# Patient Record
Sex: FEMALE | Race: WHITE | NOT HISPANIC OR LATINO | Employment: OTHER | URBAN - METROPOLITAN AREA
[De-identification: names, ages, dates, MRNs, and addresses within clinical notes are randomized per-mention and may not be internally consistent; named-entity substitution may affect disease eponyms.]

---

## 2020-12-18 ENCOUNTER — PATIENT OUTREACH (OUTPATIENT)
Dept: SCHEDULING | Facility: IMAGING CENTER | Age: 70
End: 2020-12-18

## 2020-12-18 NOTE — PROGRESS NOTES
Outcome: I called patient to see if they would like to set up a NP appt with a [facility] for their [facility]. Patient stated they would like to look up specific providers before setting an appt. I provided the member with the [phone] number to call back.    Attempt #1

## 2021-03-03 DIAGNOSIS — Z23 NEED FOR VACCINATION: ICD-10-CM

## 2021-03-30 ENCOUNTER — HOSPITAL ENCOUNTER (OUTPATIENT)
Dept: RADIOLOGY | Facility: MEDICAL CENTER | Age: 71
End: 2021-03-30
Attending: ORTHOPAEDIC SURGERY
Payer: MEDICARE

## 2021-03-30 DIAGNOSIS — M25.511 RIGHT SHOULDER PAIN, UNSPECIFIED CHRONICITY: ICD-10-CM

## 2021-03-30 PROCEDURE — 73221 MRI JOINT UPR EXTREM W/O DYE: CPT | Mod: RT,MH

## 2021-04-07 ENCOUNTER — PATIENT OUTREACH (OUTPATIENT)
Dept: HEALTH INFORMATION MANAGEMENT | Facility: OTHER | Age: 71
End: 2021-04-07

## 2021-04-07 NOTE — PROGRESS NOTES
Outcome: Call back later to schedule CGA    Please transfer to Patient Outreach Team at [phone] when patient returns call.    Attempt #1

## 2021-05-13 NOTE — PROGRESS NOTES
Transfer call from Josefina with SCP. Scheduled Comprehensive Health Assessment on 5/28/21 at 10:00am with Gerson Mancilla. Member will think about calling us back to [facility]. No questions or concerns.   Attempt #3

## 2021-07-28 ENCOUNTER — OFFICE VISIT (OUTPATIENT)
Dept: URGENT CARE | Facility: CLINIC | Age: 71
End: 2021-07-28
Payer: MEDICARE

## 2021-07-28 VITALS
OXYGEN SATURATION: 98 % | BODY MASS INDEX: 27.66 KG/M2 | WEIGHT: 166 LBS | TEMPERATURE: 97.6 F | DIASTOLIC BLOOD PRESSURE: 70 MMHG | SYSTOLIC BLOOD PRESSURE: 102 MMHG | HEIGHT: 65 IN | HEART RATE: 76 BPM | RESPIRATION RATE: 18 BRPM

## 2021-07-28 DIAGNOSIS — T63.441A LOCAL REACTION TO BEE STING, ACCIDENTAL OR UNINTENTIONAL, INITIAL ENCOUNTER: ICD-10-CM

## 2021-07-28 PROCEDURE — 99213 OFFICE O/P EST LOW 20 MIN: CPT | Performed by: PHYSICIAN ASSISTANT

## 2021-07-28 RX ORDER — METHYLPREDNISOLONE SODIUM SUCCINATE 125 MG/2ML
62.5 INJECTION, POWDER, LYOPHILIZED, FOR SOLUTION INTRAMUSCULAR; INTRAVENOUS ONCE
Status: COMPLETED | OUTPATIENT
Start: 2021-07-28 | End: 2021-07-28

## 2021-07-28 RX ORDER — DIPHENHYDRAMINE HCL 25 MG
25 TABLET ORAL EVERY 6 HOURS PRN
COMMUNITY
End: 2021-08-10

## 2021-07-28 RX ORDER — EPINEPHRINE 0.3 MG/.3ML
0.3 INJECTION SUBCUTANEOUS ONCE
Qty: 1 EACH | Refills: 1 | Status: SHIPPED | OUTPATIENT
Start: 2021-07-28 | End: 2021-07-28

## 2021-07-28 RX ADMIN — METHYLPREDNISOLONE SODIUM SUCCINATE 62.5 MG: 125 INJECTION, POWDER, LYOPHILIZED, FOR SOLUTION INTRAMUSCULAR; INTRAVENOUS at 12:04

## 2021-07-28 ASSESSMENT — ENCOUNTER SYMPTOMS
MYALGIAS: 0
DIARRHEA: 0
EYE PAIN: 0
ABDOMINAL PAIN: 0
FEVER: 0
SORE THROAT: 0
CONSTIPATION: 0
COUGH: 0
NAUSEA: 0
SHORTNESS OF BREATH: 0
VOMITING: 0
HEADACHES: 0
CHILLS: 0

## 2021-07-28 NOTE — PROGRESS NOTES
Subjective:   Griselda Obregon is a 71 y.o. female who presents for Bee Sting (crying from the pain, right and fingers hand swollen, happened  x2days ago)      HPI:  71-year-old female presents with bee sting and webspace between second and third finger of her right hand that she sustained 2 days ago.  Pain is continued as well swelling.  She notes the swelling is mildly improved today.  She has no history of anaphylaxis or poor reactions to bee stings in the past.  No difficulty breathing, tongue swelling, wheezing, nausea or vomiting.  Has been icing it with minimal relief.  Has been taking Benadryl which seems to help with the pruritus    Review of Systems   Constitutional: Negative for chills and fever.   HENT: Negative for congestion, ear pain and sore throat.    Eyes: Negative for pain.   Respiratory: Negative for cough and shortness of breath.    Cardiovascular: Negative for chest pain.   Gastrointestinal: Negative for abdominal pain, constipation, diarrhea, nausea and vomiting.   Genitourinary: Negative for dysuria.   Musculoskeletal: Negative for myalgias.   Skin: Positive for itching and rash.   Neurological: Negative for headaches.       Medications:    • diphenhydrAMINE Tabs  • EPINEPHrine Soaj  • MULTIVITAMIN PO  • PROBIOTIC ACIDOPHILUS PO  • traZODone Tabs  • venlafaxine    Allergies: Patient has no known allergies.    Problem List: Griselda Obregon does not have any pertinent problems on file.    Surgical History:  Past Surgical History:   Procedure Laterality Date   • HIP ARTHROPLASTY TOTAL Right 2016    Procedure: HIP ARTHROPLASTY TOTAL;  Surgeon: Les Dyson M.D.;  Location: SURGERY [facility];  Service:    • OTHER Bilateral 2000    foot surgery   • GYN SURGERY  ;            Past Social Hx: Griselda Obregon  reports that she has never smoked. She does not have any smokeless tobacco history on file. She reports current alcohol use. She reports that she does  "not use drugs.     Past Family Hx:  Griselda Obregon family history is not on file.     Problem list, medications, and allergies reviewed by myself today in Epic.     Objective:     /70 (BP Location: Left arm, Patient Position: Sitting, BP Cuff Size: Adult)   Pulse 76   Temp 36.4 °C (97.6 °F) (Temporal)   Resp 18   Ht 1.651 m (5' 5\")   Wt 75.3 kg (166 lb)   SpO2 98%   BMI 27.62 kg/m²     Physical Exam  Vitals reviewed.   Constitutional:       Appearance: Normal appearance.   HENT:      Head: Normocephalic and atraumatic.      Right Ear: External ear normal.      Left Ear: External ear normal.      Nose: Nose normal.      Mouth/Throat:      Mouth: Mucous membranes are moist.   Eyes:      Conjunctiva/sclera: Conjunctivae normal.   Cardiovascular:      Rate and Rhythm: Normal rate.   Pulmonary:      Effort: Pulmonary effort is normal.   Skin:     General: Skin is warm and dry.      Capillary Refill: Capillary refill takes less than 2 seconds.      Comments: Diffuse edema of the right hand and fingers.  Focal rash webspace between second and third finger with obvious injection site from envenomation.  Brisk cap refill.  Distally neurovascularly intact.   Neurological:      Mental Status: She is alert and oriented to person, place, and time.         Assessment/Plan:     Diagnosis and associated orders:     1. Local reaction to bee sting, accidental or unintentional, initial encounter  methylPREDNISolone sod succ (SOLU-MEDROL) 125 MG injection 62.5 mg    EPINEPHrine (EPIPEN) 0.3 MG/0.3ML Solution Auto-injector solution for injection      Comments/MDM:     • Solu-Medrol 62.5 mg in clinic  • Continue Benadryl or consider Claritin during the day and Benadryl at night  • Try topical diphenhydramine cream  • Elevate  • Ice  • Review of up-to-date reveals patient at modest increased risk of anaphylaxis secondary to repeat bee sting, as a matter of caution will prescribe EpiPen.  • Return if not significantly " improving in the next 24 to 48 hours  • Counseled patient that pain and envenomation may worsen for the first 24 to 48 hours, she is around 48 hours out but she may continue to have some pain and swelling for 3 to 7 days following.  If wound does not follow the trajectory she is to return to clinic immediately.         Differential diagnosis, natural history, supportive care, and indications for immediate follow-up discussed.    Advised the patient to follow-up with the primary care physician for recheck, reevaluation, and consideration of further management.    Please note that this dictation was created using voice recognition software. I have made a reasonable attempt to correct obvious errors, but I expect that there are errors of grammar and possibly content that I did not discover before finalizing the note.    This note was electronically signed by David Palacios PA-C

## 2021-07-29 ENCOUNTER — TELEPHONE (OUTPATIENT)
Dept: SCHEDULING | Facility: IMAGING CENTER | Age: 71
End: 2021-07-29

## 2021-08-02 NOTE — NON-PROVIDER
Outcome: Left message to reschedule rajeev     Please transfer to Patient Outreach Team at [phone] when patient returns call.      Attempt #1

## 2021-08-02 NOTE — NON-PROVIDER
Outcome:  Rescheduled ZULMA    Please transfer to Patient Outreach Team at [phone] when patient returns call.      [facility] Verified: yes

## 2021-08-10 ENCOUNTER — OFFICE VISIT (OUTPATIENT)
Dept: MEDICAL GROUP | Facility: MEDICAL CENTER | Age: 71
End: 2021-08-10
Payer: MEDICARE

## 2021-08-10 VITALS
BODY MASS INDEX: 28.12 KG/M2 | TEMPERATURE: 98.6 F | RESPIRATION RATE: 16 BRPM | HEIGHT: 65 IN | SYSTOLIC BLOOD PRESSURE: 114 MMHG | DIASTOLIC BLOOD PRESSURE: 70 MMHG | WEIGHT: 168.8 LBS | OXYGEN SATURATION: 96 % | HEART RATE: 80 BPM

## 2021-08-10 DIAGNOSIS — F51.01 PRIMARY INSOMNIA: ICD-10-CM

## 2021-08-10 DIAGNOSIS — Z23 NEED FOR VACCINATION: ICD-10-CM

## 2021-08-10 DIAGNOSIS — Z11.59 NEED FOR HEPATITIS C SCREENING TEST: ICD-10-CM

## 2021-08-10 DIAGNOSIS — R73.9 HYPERGLYCEMIA: ICD-10-CM

## 2021-08-10 DIAGNOSIS — E03.9 ACQUIRED HYPOTHYROIDISM: ICD-10-CM

## 2021-08-10 DIAGNOSIS — F33.42 RECURRENT MAJOR DEPRESSIVE DISORDER, IN FULL REMISSION (HCC): ICD-10-CM

## 2021-08-10 DIAGNOSIS — Z78.0 POSTMENOPAUSAL: ICD-10-CM

## 2021-08-10 PROCEDURE — 99204 OFFICE O/P NEW MOD 45 MIN: CPT | Mod: 25 | Performed by: FAMILY MEDICINE

## 2021-08-10 PROCEDURE — G0009 ADMIN PNEUMOCOCCAL VACCINE: HCPCS | Performed by: FAMILY MEDICINE

## 2021-08-10 PROCEDURE — 90732 PPSV23 VACC 2 YRS+ SUBQ/IM: CPT | Performed by: FAMILY MEDICINE

## 2021-08-10 RX ORDER — TRAZODONE HYDROCHLORIDE 50 MG/1
50 TABLET ORAL
Qty: 90 TABLET | Refills: 2 | Status: SHIPPED | OUTPATIENT
Start: 2021-08-10 | End: 2022-10-18

## 2021-08-10 RX ORDER — VENLAFAXINE HYDROCHLORIDE 150 MG/1
150 CAPSULE, EXTENDED RELEASE ORAL DAILY
Qty: 90 CAPSULE | Refills: 2 | Status: SHIPPED | OUTPATIENT
Start: 2021-08-10 | End: 2022-10-18

## 2021-08-10 ASSESSMENT — PATIENT HEALTH QUESTIONNAIRE - PHQ9: CLINICAL INTERPRETATION OF PHQ2 SCORE: 0

## 2021-08-10 NOTE — ASSESSMENT & PLAN NOTE
Griselda is trying diligently to lose weight; she has had success previously with phentermine. She is finding it difficult to get back to a regular exercise routine.

## 2021-08-10 NOTE — LETTER
[facility]  Margot Garcia M.D.  [address]  [place] [address]  Fax: [phone]   Authorization for Release/Disclosure of   Protected Health Information   Name: GRISELDA URRUTIA : 1950 SSN: [id number]   Address: [address] [place] [address] [place] [address] Phone:    [phone] (home)    I authorize the entity listed below to release/disclose the PHI below to:   [facility]/Margot Gracia M.D. and Margot Garcia M.D.   Provider or Entity Name:[facility]     Address   City, [facility]   Phone:[phone]      Fax:[phone]     Reason for request: continuity of care   Information to be released:    [ xxx ] LAST COLONOSCOPY,  including any PATH REPORT and follow-up  [  ] LAST FIT/COLOGUARD RESULT [  ] LAST DEXA  [  ] LAST MAMMOGRAM  [  ] LAST PAP  [  ] LAST LABS [  ] RETINA EXAM REPORT  [  ] IMMUNIZATION RECORDS  [  ] Release all info      [  ] Check here and initial the line next to each item to release ALL health information INCLUDING  _____ Care and treatment for drug and / or alcohol abuse  _____ HIV testing, infection status, or AIDS  _____ Genetic Testing    DATES OF SERVICE OR TIME PERIOD TO BE DISCLOSED: _____________  I understand and acknowledge that:  * This Authorization may be revoked at any time by you in writing, except if your health information has already been used or disclosed.  * Your health information that will be used or disclosed as a result of you signing this authorization could be re-disclosed by the recipient. If this occurs, your re-disclosed health information may no longer be protected by State or Federal laws.  * You may refuse to sign this Authorization. Your refusal will not affect your ability to obtain treatment.  * This Authorization becomes effective upon signing and will  on (date) __________.      If no date is indicated, this Authorization will  one (1) year from the signature date.    Name: Griselda Lucas  Mindi    Signature:Continuation of Care   Date:     8/10/2021       PLEASE FAX REQUESTED RECORDS BACK TO: [phone]

## 2021-08-10 NOTE — LETTER
[facility]  Margot Garcia M.D.  [address]  [place] [address]  Fax: [phone]   Authorization for Release/Disclosure of   Protected Health Information   Name: GRISELDA URRUTIA : 1950 SSN: [id number]   Address: [address] [place] [address] [address] [place] [address] Phone:    [phone] (home)    I authorize the entity listed below to release/disclose the PHI below to:   [facility]/Margot Garcia M.D. and Margot Garcia M.D.   Provider or Entity Name:[facility] Address City, State, Zip   Phone:[phone]      Fax:[phone]     Reason for request: continuity of care   Information to be released:    [  ] LAST COLONOSCOPY,  including any PATH REPORT and follow-up  [  ] LAST FIT/COLOGUARD RESULT [  ] LAST DEXA  [  ] LAST MAMMOGRAM  [  ] LAST PAP  [  ] LAST LABS [  ] RETINA EXAM REPORT  [  ] IMMUNIZATION RECORDS  [ xxx ] Release all info      [  ] Check here and initial the line next to each item to release ALL health information INCLUDING  _____ Care and treatment for drug and / or alcohol abuse  _____ HIV testing, infection status, or AIDS  _____ Genetic Testing    DATES OF SERVICE OR TIME PERIOD TO BE DISCLOSED: _____________  I understand and acknowledge that:  * This Authorization may be revoked at any time by you in writing, except if your health information has already been used or disclosed.  * Your health information that will be used or disclosed as a result of you signing this authorization could be re-disclosed by the recipient. If this occurs, your re-disclosed health information may no longer be protected by State or Federal laws.  * You may refuse to sign this Authorization. Your refusal will not affect your ability to obtain treatment.  * This Authorization becomes effective upon signing and will  on (date) __________.      If no date is indicated, this Authorization will  one (1) year from the signature date.    Name: Griselda Lucas  Mindi    Signature:Continuation of Care   Date:     8/10/2021       PLEASE FAX REQUESTED RECORDS BACK TO: [phone]

## 2021-08-10 NOTE — PROGRESS NOTES
Subjective:     CC: establish care    HPI:   Griselda presents today to establish care. She is a consultant in communication, single, has two children and grandchildren who live locally, one of her sons is co-owner at [employer]. She enjoys skiing. She is UTD on colonoscopy, mammogram is scheduled, due for dexa scan.     BMI 28.0-28.9,adult  Griselda is trying diligently to lose weight; she has had success previously with phentermine. She is finding it difficult to get back to a regular exercise routine.       Recurrent major depressive disorder, in full remission (HCC)  Griselda reports her mood is well-controlled with venlafaxine XL 150mg daily. She has been on this medication for several years and definitely notices a difference if she runs out of medication.     Primary insomnia  Griselda reports her insomnia is well-controlled with trazodone 50mg qhs.      Past Medical History:   Diagnosis Date   • Bowel habit changes     constipation   • Pain 11/8/16    Right hip   • Psychiatric problem     depression       Social History     Tobacco Use   • Smoking status: Never Smoker   • Smokeless tobacco: Never Used   Vaping Use   • Vaping Use: Never used   Substance Use Topics   • Alcohol use: Yes     Comment: once a month   • Drug use: No       Current Outpatient Medications Ordered in Epic   Medication Sig Dispense Refill   • venlafaxine (EFFEXOR-XR) 150 MG extended-release capsule Take 1 capsule by mouth every day. 90 capsule 2   • traZODone (DESYREL) 50 MG Tab Take 1 tablet by mouth at bedtime. 90 tablet 2     No current Epic-ordered facility-administered medications on file.       Allergies:  Bee    Health Maintenance: See above    ROS:  Gen: no fevers/chills, no changes in weight  Eyes: no changes in vision  ENT: no sore throat, no hearing loss, no bloody nose  Pulm: no SOB  CV: no chest pain    Objective:     Exam:  /70 (BP Location: Left arm, Patient Position: Sitting, BP Cuff Size: Adult long)   Pulse 80   Temp 37  "°C (98.6 °F) (Temporal)   Resp 16   Ht 1.651 m (5' 5\")   Wt 76.6 kg (168 lb 12.8 oz)   SpO2 96%   BMI 28.09 kg/m²  Body mass index is 28.09 kg/m².    Gen: Alert and oriented, No apparent distress  Lungs: Normal effort, CTA bilaterally, no wheezes, rhonchi, or rales  CV: Regular rate and rhythm, no murmurs, rubs, or gallops      Assessment & Plan:     71 y.o. female with the following -     1. Postmenopausal  - DS-BONE DENSITY STUDY (DEXA); Future    2. Acquired hypothyroidism  Griselda reports history of \"mild\" hypothyroidism, was previously on low dose levothyroxine  - Lipid Profile; Future  - Comp Metabolic Panel; Future  - HEMOGLOBIN A1C; Future  - CBC WITH DIFFERENTIAL; Future  - TSH WITH REFLEX TO FT4; Future    3. Hyperglycemia  - HEMOGLOBIN A1C; Future    4. BMI 28.0-28.9,adult  - Griselda is working on weight loss, suspect untreated hypothyroidism may be contributing to her difficulty in weight loss    5. Recurrent major depressive disorder, in full remission (HCC)  - venlafaxine (EFFEXOR-XR) 150 MG extended-release capsule; Take 1 capsule by mouth every day.  Dispense: 90 capsule; Refill: 2    6. Primary insomnia  - traZODone (DESYREL) 50 MG Tab; Take 1 tablet by mouth at bedtime.  Dispense: 90 tablet; Refill: 2    7. Need for hepatitis C screening test  - HCV Scrn ( 6398-0619 1xLife); Future    8. Need for vaccination  - Pneumococal Polysaccharide Vaccine 23-Valent =>1YO SQ/IM      Return in about 6 weeks (around 2021) for F/U lab.    Please note this dictation was created using voice recognition software. I have made every reasonable attempt to correct obvious errors, but I expect there may be errors of grammar, and possibly content, that I did not discover before finalizing the note.       "

## 2021-09-22 ENCOUNTER — TELEPHONE (OUTPATIENT)
Dept: MEDICAL GROUP | Facility: MEDICAL CENTER | Age: 71
End: 2021-09-22

## 2021-09-22 ENCOUNTER — HOSPITAL ENCOUNTER (OUTPATIENT)
Dept: RADIOLOGY | Facility: MEDICAL CENTER | Age: 71
End: 2021-09-22
Attending: FAMILY MEDICINE
Payer: MEDICARE

## 2021-09-22 DIAGNOSIS — Z78.0 POSTMENOPAUSAL: ICD-10-CM

## 2021-09-22 DIAGNOSIS — Z12.31 VISIT FOR SCREENING MAMMOGRAM: ICD-10-CM

## 2021-09-22 PROCEDURE — 77063 BREAST TOMOSYNTHESIS BI: CPT

## 2021-09-22 PROCEDURE — 77080 DXA BONE DENSITY AXIAL: CPT

## 2021-09-22 NOTE — TELEPHONE ENCOUNTER
Phone Number Called: [phone] (home)     Call outcome: Did not leave a detailed message. Requested patient to call back.    Message:   ----- Message from Margot Garcia M.D. sent at 9/22/2021 12:56 PM PDT -----  Please notify patient that I have reviewed her bone density scan which did demonstrate mild bone loss. I would recommend daily weight bearing exercise, calcium - 1,200mg daily through dietary and supplemental sources, and at least 800IU of vitamin D daily

## 2021-09-23 ENCOUNTER — TELEPHONE (OUTPATIENT)
Dept: MEDICAL GROUP | Facility: MEDICAL CENTER | Age: 71
End: 2021-09-23

## 2021-09-23 NOTE — TELEPHONE ENCOUNTER
Phone Number Called: [phone] (home)     Call outcome: Spoke to patient regarding message below.    Message: Informed pt about her bone density results. Pt understood and did not have any further questions.

## 2021-09-27 ENCOUNTER — HOSPITAL ENCOUNTER (OUTPATIENT)
Dept: RADIOLOGY | Facility: MEDICAL CENTER | Age: 71
End: 2021-09-27
Payer: MEDICARE

## 2021-10-01 ENCOUNTER — HOSPITAL ENCOUNTER (OUTPATIENT)
Dept: LAB | Facility: MEDICAL CENTER | Age: 71
End: 2021-10-01
Attending: FAMILY MEDICINE
Payer: MEDICARE

## 2021-10-01 ENCOUNTER — TELEPHONE (OUTPATIENT)
Dept: MEDICAL GROUP | Facility: MEDICAL CENTER | Age: 71
End: 2021-10-01

## 2021-10-01 DIAGNOSIS — R73.9 HYPERGLYCEMIA: ICD-10-CM

## 2021-10-01 DIAGNOSIS — Z11.59 NEED FOR HEPATITIS C SCREENING TEST: ICD-10-CM

## 2021-10-01 DIAGNOSIS — E03.9 ACQUIRED HYPOTHYROIDISM: ICD-10-CM

## 2021-10-01 LAB
ALBUMIN SERPL BCP-MCNC: 4.4 G/DL (ref 3.2–4.9)
ALBUMIN/GLOB SERPL: 1.6 G/DL
ALP SERPL-CCNC: 80 U/L (ref 30–99)
ALT SERPL-CCNC: 19 U/L (ref 2–50)
ANION GAP SERPL CALC-SCNC: 11 MMOL/L (ref 7–16)
AST SERPL-CCNC: 19 U/L (ref 12–45)
BASOPHILS # BLD AUTO: 0.4 % (ref 0–1.8)
BASOPHILS # BLD: 0.02 K/UL (ref 0–0.12)
BILIRUB SERPL-MCNC: 0.5 MG/DL (ref 0.1–1.5)
BUN SERPL-MCNC: 29 MG/DL (ref 8–22)
CALCIUM SERPL-MCNC: 9.6 MG/DL (ref 8.5–10.5)
CHLORIDE SERPL-SCNC: 103 MMOL/L (ref 96–112)
CHOLEST SERPL-MCNC: 205 MG/DL (ref 100–199)
CO2 SERPL-SCNC: 25 MMOL/L (ref 20–33)
CREAT SERPL-MCNC: 1.12 MG/DL (ref 0.5–1.4)
EOSINOPHIL # BLD AUTO: 0.04 K/UL (ref 0–0.51)
EOSINOPHIL NFR BLD: 0.9 % (ref 0–6.9)
ERYTHROCYTE [DISTWIDTH] IN BLOOD BY AUTOMATED COUNT: 45 FL (ref 35.9–50)
EST. AVERAGE GLUCOSE BLD GHB EST-MCNC: 103 MG/DL
FASTING STATUS PATIENT QL REPORTED: NORMAL
GLOBULIN SER CALC-MCNC: 2.8 G/DL (ref 1.9–3.5)
GLUCOSE SERPL-MCNC: 91 MG/DL (ref 65–99)
HBA1C MFR BLD: 5.2 % (ref 4–5.6)
HCT VFR BLD AUTO: 44.1 % (ref 37–47)
HCV AB SER QL: NORMAL
HDLC SERPL-MCNC: 56 MG/DL
HGB BLD-MCNC: 14.8 G/DL (ref 12–16)
IMM GRANULOCYTES # BLD AUTO: 0.01 K/UL (ref 0–0.11)
IMM GRANULOCYTES NFR BLD AUTO: 0.2 % (ref 0–0.9)
LDLC SERPL CALC-MCNC: 132 MG/DL
LYMPHOCYTES # BLD AUTO: 1.86 K/UL (ref 1–4.8)
LYMPHOCYTES NFR BLD: 40.5 % (ref 22–41)
MCH RBC QN AUTO: 31.2 PG (ref 27–33)
MCHC RBC AUTO-ENTMCNC: 33.6 G/DL (ref 33.6–35)
MCV RBC AUTO: 92.8 FL (ref 81.4–97.8)
MONOCYTES # BLD AUTO: 0.38 K/UL (ref 0–0.85)
MONOCYTES NFR BLD AUTO: 8.3 % (ref 0–13.4)
NEUTROPHILS # BLD AUTO: 2.28 K/UL (ref 2–7.15)
NEUTROPHILS NFR BLD: 49.7 % (ref 44–72)
NRBC # BLD AUTO: 0 K/UL
NRBC BLD-RTO: 0 /100 WBC
PLATELET # BLD AUTO: 210 K/UL (ref 164–446)
PMV BLD AUTO: 11.5 FL (ref 9–12.9)
POTASSIUM SERPL-SCNC: 4.1 MMOL/L (ref 3.6–5.5)
PROT SERPL-MCNC: 7.2 G/DL (ref 6–8.2)
RBC # BLD AUTO: 4.75 M/UL (ref 4.2–5.4)
SODIUM SERPL-SCNC: 139 MMOL/L (ref 135–145)
TRIGL SERPL-MCNC: 87 MG/DL (ref 0–149)
TSH SERPL DL<=0.005 MIU/L-ACNC: 4.2 UIU/ML (ref 0.38–5.33)
WBC # BLD AUTO: 4.6 K/UL (ref 4.8–10.8)

## 2021-10-01 PROCEDURE — 36415 COLL VENOUS BLD VENIPUNCTURE: CPT

## 2021-10-01 PROCEDURE — 84443 ASSAY THYROID STIM HORMONE: CPT

## 2021-10-01 PROCEDURE — 80061 LIPID PANEL: CPT

## 2021-10-01 PROCEDURE — 83036 HEMOGLOBIN GLYCOSYLATED A1C: CPT

## 2021-10-01 PROCEDURE — G0472 HEP C SCREEN HIGH RISK/OTHER: HCPCS

## 2021-10-01 PROCEDURE — 80053 COMPREHEN METABOLIC PANEL: CPT

## 2021-10-01 PROCEDURE — 85025 COMPLETE CBC W/AUTO DIFF WBC: CPT

## 2021-10-01 NOTE — TELEPHONE ENCOUNTER
Phone Number Called: [phone] (home)     Call outcome: Did not leave a detailed message. Requested patient to call back.    Message:   ----- Message from Margot Garcia M.D. sent at 10/1/2021  9:04 AM PDT -----  Please notify patient that no concerning masses were identified on mammogram.  We will plan to repeat her screening in 1 year.

## 2021-10-04 ENCOUNTER — TELEPHONE (OUTPATIENT)
Dept: MEDICAL GROUP | Facility: MEDICAL CENTER | Age: 71
End: 2021-10-04

## 2021-10-04 NOTE — TELEPHONE ENCOUNTER
Called and spoke to pt. Relayed lab results notes per Dr. Garcia. Pt verbalized understanding and denies any further needs at this time.

## 2021-10-08 ENCOUNTER — OFFICE VISIT (OUTPATIENT)
Dept: MEDICAL GROUP | Facility: MEDICAL CENTER | Age: 71
End: 2021-10-08
Payer: MEDICARE

## 2021-10-08 VITALS
BODY MASS INDEX: 29.02 KG/M2 | HEIGHT: 64 IN | DIASTOLIC BLOOD PRESSURE: 68 MMHG | OXYGEN SATURATION: 90 % | SYSTOLIC BLOOD PRESSURE: 110 MMHG | WEIGHT: 170 LBS | RESPIRATION RATE: 20 BRPM | TEMPERATURE: 98.4 F | HEART RATE: 84 BPM

## 2021-10-08 DIAGNOSIS — Z23 NEED FOR VACCINATION: ICD-10-CM

## 2021-10-08 DIAGNOSIS — E78.5 DYSLIPIDEMIA: ICD-10-CM

## 2021-10-08 DIAGNOSIS — F33.42 RECURRENT MAJOR DEPRESSIVE DISORDER, IN FULL REMISSION (HCC): ICD-10-CM

## 2021-10-08 DIAGNOSIS — R94.4 DECREASED GFR: ICD-10-CM

## 2021-10-08 DIAGNOSIS — M85.80 OSTEOPENIA, UNSPECIFIED LOCATION: ICD-10-CM

## 2021-10-08 PROCEDURE — G0008 ADMIN INFLUENZA VIRUS VAC: HCPCS | Performed by: FAMILY MEDICINE

## 2021-10-08 PROCEDURE — 90750 HZV VACC RECOMBINANT IM: CPT | Performed by: FAMILY MEDICINE

## 2021-10-08 PROCEDURE — 90472 IMMUNIZATION ADMIN EACH ADD: CPT | Performed by: FAMILY MEDICINE

## 2021-10-08 PROCEDURE — 99214 OFFICE O/P EST MOD 30 MIN: CPT | Mod: 25 | Performed by: FAMILY MEDICINE

## 2021-10-08 PROCEDURE — 90662 IIV NO PRSV INCREASED AG IM: CPT | Performed by: FAMILY MEDICINE

## 2021-10-08 ASSESSMENT — ANXIETY QUESTIONNAIRES
6. BECOMING EASILY ANNOYED OR IRRITABLE: NOT AT ALL
2. NOT BEING ABLE TO STOP OR CONTROL WORRYING: SEVERAL DAYS
1. FEELING NERVOUS, ANXIOUS, OR ON EDGE: SEVERAL DAYS
3. WORRYING TOO MUCH ABOUT DIFFERENT THINGS: SEVERAL DAYS
IF YOU CHECKED OFF ANY PROBLEMS ON THIS QUESTIONNAIRE, HOW DIFFICULT HAVE THESE PROBLEMS MADE IT FOR YOU TO DO YOUR WORK, TAKE CARE OF THINGS AT HOME, OR GET ALONG WITH OTHER PEOPLE: NOT DIFFICULT AT ALL
GAD7 TOTAL SCORE: 4
7. FEELING AFRAID AS IF SOMETHING AWFUL MIGHT HAPPEN: SEVERAL DAYS
4. TROUBLE RELAXING: NOT AT ALL
5. BEING SO RESTLESS THAT IT IS HARD TO SIT STILL: NOT AT ALL

## 2021-10-08 ASSESSMENT — PATIENT HEALTH QUESTIONNAIRE - PHQ9
SUM OF ALL RESPONSES TO PHQ QUESTIONS 1-9: 9
CLINICAL INTERPRETATION OF PHQ2 SCORE: 2
5. POOR APPETITE OR OVEREATING: 2 - MORE THAN HALF THE DAYS

## 2021-10-08 ASSESSMENT — FIBROSIS 4 INDEX: FIB4 SCORE: 1.452966314513557851

## 2021-10-08 NOTE — ASSESSMENT & PLAN NOTE
Generally well-controlled on venlafaxine XR 150mg daily however Griselda report she has been quite anxious and at times depressed about aging. She does not want to be a burden for her children. She is nervous about the changes aging may bring.

## 2021-10-08 NOTE — PROGRESS NOTES
Subjective:     CC: follow up labs, imaging    HPI:   Griselda presents today with:    Recurrent major depressive disorder, in full remission (HCC)  Generally well-controlled on venlafaxine XR 150mg daily however Griselda report she has been quite anxious and at times depressed about aging. She does not want to be a burden for her children. She is nervous about the changes aging may bring.    Dyslipidemia  Recent labs demonstrate mild-mod elevation of LDL per below. 10 year ASCVD risk 7.7%. Griselda reports a very healthy Mediterranean type diet with minimal red meat, healthy fats. No family history of cardiovascular disease.    Lab Results   Component Value Date/Time    CHOLSTRLTOT 205 (H) 10/01/2021 11:05 AM     (H) 10/01/2021 11:05 AM    HDL 56 10/01/2021 11:05 AM    TRIGLYCERIDE 87 10/01/2021 11:05 AM           Decreased GFR  Recent labs reveal GFR of 48. She does use naproxen less than once weekly.  She reports she had not had anything to drink preceding labs.    Osteopenia  Griselda is getting regular weight-bearing exercise. Adequate calcium and vit D.    Dexa 9/2021:   10-year Probability of Fracture:  Major Osteoporotic     10.5%  Hip     1.8%  Population      [place] ()      Past Medical History:   Diagnosis Date   • Bowel habit changes     constipation   • Pain 11/8/16    Right hip   • Psychiatric problem     depression       Social History     Tobacco Use   • Smoking status: Never Smoker   • Smokeless tobacco: Never Used   Vaping Use   • Vaping Use: Never used   Substance Use Topics   • Alcohol use: Yes     Comment: once a month   • Drug use: No       Current Outpatient Medications Ordered in Epic   Medication Sig Dispense Refill   • venlafaxine (EFFEXOR-XR) 150 MG extended-release capsule Take 1 capsule by mouth every day. 90 capsule 2   • traZODone (DESYREL) 50 MG Tab Take 1 tablet by mouth at bedtime. 90 tablet 2     No current Epic-ordered facility-administered medications on file.  "      Allergies:  Bee    Health Maintenance: Patient reports she hasn't had a pap for many years; will complete one more pap (if normal). Colonoscopy UTD, scanned in media    ROS:  Gen: no fevers/chills, no changes in weight  Eyes: no changes in vision  ENT: no sore throat, no hearing loss, no bloody nose  Pulm: no SOB  CV: no chest pain    Objective:       Exam:  /68 (BP Location: Left arm, Patient Position: Sitting, BP Cuff Size: Adult long)   Pulse 84   Temp 36.9 °C (98.4 °F) (Temporal)   Resp 20   Ht 1.626 m (5' 4\")   Wt 77.1 kg (170 lb)   SpO2 90%   BMI 29.18 kg/m²  Body mass index is 29.18 kg/m².    Constitutional: Alert, no distress, well-groomed  Skin: Warm, dry, good turgor, no rashes in visible areas  Eyes: Equal, round and reactive, conjunctiva clear, no ptosis  Respiratory: Unlabored respiratory effort  Psych: Alert and oriented, normal affect and mood      Assessment & Plan:     71 y.o. female with the following -     1. Recurrent major depressive disorder, in full remission (HCC)  Stable, Griselda will be working on finding psychologist or  to help her navigate her anxiety regarding aging. Continue venlafaxine XR 150mg daily, continue regular exercise healthy diet, positive social interaction    2. Dyslipidemia  Discussed importance of continuing healthy, Mediterranean type diet    3. Decreased GFR  Perhaps secondary to dehydration; recommended patient avoid NSAIDS  - Basic Metabolic Panel; Future    4. Osteopenia, unspecified location  - Continue weight bearing exercise; adequate vit D and calcium    5. Need for vaccination  - Influenza Vaccine, High Dose (65+ Only)  - Shingrix Vaccine      Return in about 1 month (around 11/8/2021) for Pap.    Please note this dictation was created using voice recognition software. I have made every reasonable attempt to correct obvious errors, but I expect there may be errors of grammar, and possibly content, that I did not discover before " finalizing the note.

## 2021-10-08 NOTE — ASSESSMENT & PLAN NOTE
Recent labs demonstrate mild-mod elevation of LDL per below. 10 year ASCVD risk 7.7%. Griselda reports a very healthy Mediterranean type diet with minimal red meat, healthy fats. No family history of cardiovascular disease.    Lab Results   Component Value Date/Time    CHOLSTRLTOT 205 (H) 10/01/2021 11:05 AM     (H) 10/01/2021 11:05 AM    HDL 56 10/01/2021 11:05 AM    TRIGLYCERIDE 87 10/01/2021 11:05 AM

## 2021-10-08 NOTE — ASSESSMENT & PLAN NOTE
Griselda is getting regular weight-bearing exercise. Adequate calcium and vit D.    Dexa 9/2021:   10-year Probability of Fracture:  Major Osteoporotic     10.5%  Hip     1.8%  Population      [place] ()

## 2021-10-08 NOTE — ASSESSMENT & PLAN NOTE
Recent labs reveal GFR of 48. She does use naproxen less than once weekly.  She reports she had not had anything to drink preceding labs.

## 2021-11-11 ENCOUNTER — HOSPITAL ENCOUNTER (OUTPATIENT)
Dept: LAB | Facility: MEDICAL CENTER | Age: 71
End: 2021-11-11
Attending: FAMILY MEDICINE
Payer: MEDICARE

## 2021-11-11 DIAGNOSIS — R94.4 DECREASED GFR: ICD-10-CM

## 2021-11-11 LAB
ANION GAP SERPL CALC-SCNC: 11 MMOL/L (ref 7–16)
BUN SERPL-MCNC: 25 MG/DL (ref 8–22)
CALCIUM SERPL-MCNC: 9.7 MG/DL (ref 8.5–10.5)
CHLORIDE SERPL-SCNC: 105 MMOL/L (ref 96–112)
CO2 SERPL-SCNC: 23 MMOL/L (ref 20–33)
CREAT SERPL-MCNC: 1.06 MG/DL (ref 0.5–1.4)
GLUCOSE SERPL-MCNC: 79 MG/DL (ref 65–99)
POTASSIUM SERPL-SCNC: 4.4 MMOL/L (ref 3.6–5.5)
SODIUM SERPL-SCNC: 139 MMOL/L (ref 135–145)

## 2021-11-11 PROCEDURE — 80048 BASIC METABOLIC PNL TOTAL CA: CPT

## 2021-11-11 PROCEDURE — 36415 COLL VENOUS BLD VENIPUNCTURE: CPT

## 2021-11-17 SDOH — ECONOMIC STABILITY: FOOD INSECURITY: WITHIN THE PAST 12 MONTHS, YOU WORRIED THAT YOUR FOOD WOULD RUN OUT BEFORE YOU GOT MONEY TO BUY MORE.: NEVER TRUE

## 2021-11-17 SDOH — ECONOMIC STABILITY: TRANSPORTATION INSECURITY
IN THE PAST 12 MONTHS, HAS THE LACK OF TRANSPORTATION KEPT YOU FROM MEDICAL APPOINTMENTS OR FROM GETTING MEDICATIONS?: NO

## 2021-11-17 SDOH — ECONOMIC STABILITY: TRANSPORTATION INSECURITY
IN THE PAST 12 MONTHS, HAS LACK OF RELIABLE TRANSPORTATION KEPT YOU FROM MEDICAL APPOINTMENTS, MEETINGS, WORK OR FROM GETTING THINGS NEEDED FOR DAILY LIVING?: NO

## 2021-11-17 SDOH — ECONOMIC STABILITY: INCOME INSECURITY: HOW HARD IS IT FOR YOU TO PAY FOR THE VERY BASICS LIKE FOOD, HOUSING, MEDICAL CARE, AND HEATING?: NOT VERY HARD

## 2021-11-17 SDOH — ECONOMIC STABILITY: FOOD INSECURITY: WITHIN THE PAST 12 MONTHS, THE FOOD YOU BOUGHT JUST DIDN'T LAST AND YOU DIDN'T HAVE MONEY TO GET MORE.: NEVER TRUE

## 2021-11-17 SDOH — HEALTH STABILITY: PHYSICAL HEALTH: ON AVERAGE, HOW MANY DAYS PER WEEK DO YOU ENGAGE IN MODERATE TO STRENUOUS EXERCISE (LIKE A BRISK WALK)?: 3 DAYS

## 2021-11-17 SDOH — ECONOMIC STABILITY: INCOME INSECURITY: IN THE LAST 12 MONTHS, WAS THERE A TIME WHEN YOU WERE NOT ABLE TO PAY THE MORTGAGE OR RENT ON TIME?: NO

## 2021-11-17 SDOH — ECONOMIC STABILITY: HOUSING INSECURITY: IN THE LAST 12 MONTHS, HOW MANY PLACES HAVE YOU LIVED?: 1

## 2021-11-17 SDOH — ECONOMIC STABILITY: HOUSING INSECURITY
IN THE LAST 12 MONTHS, WAS THERE A TIME WHEN YOU DID NOT HAVE A STEADY PLACE TO SLEEP OR SLEPT IN A SHELTER (INCLUDING NOW)?: NO

## 2021-11-17 SDOH — HEALTH STABILITY: PHYSICAL HEALTH: ON AVERAGE, HOW MANY MINUTES DO YOU ENGAGE IN EXERCISE AT THIS LEVEL?: 50 MIN

## 2021-11-17 SDOH — ECONOMIC STABILITY: TRANSPORTATION INSECURITY
IN THE PAST 12 MONTHS, HAS LACK OF TRANSPORTATION KEPT YOU FROM MEETINGS, WORK, OR FROM GETTING THINGS NEEDED FOR DAILY LIVING?: NO

## 2021-11-17 SDOH — HEALTH STABILITY: MENTAL HEALTH
STRESS IS WHEN SOMEONE FEELS TENSE, NERVOUS, ANXIOUS, OR CAN'T SLEEP AT NIGHT BECAUSE THEIR MIND IS TROUBLED. HOW STRESSED ARE YOU?: ONLY A LITTLE

## 2021-11-17 ASSESSMENT — SOCIAL DETERMINANTS OF HEALTH (SDOH)
DO YOU BELONG TO ANY CLUBS OR ORGANIZATIONS SUCH AS CHURCH GROUPS UNIONS, FRATERNAL OR ATHLETIC GROUPS, OR SCHOOL GROUPS?: YES
DO YOU BELONG TO ANY CLUBS OR ORGANIZATIONS SUCH AS CHURCH GROUPS UNIONS, FRATERNAL OR ATHLETIC GROUPS, OR SCHOOL GROUPS?: YES
HOW OFTEN DO YOU ATTEND CHURCH OR RELIGIOUS SERVICES?: NEVER
HOW OFTEN DO YOU HAVE SIX OR MORE DRINKS ON ONE OCCASION: NEVER
IN A TYPICAL WEEK, HOW MANY TIMES DO YOU TALK ON THE PHONE WITH FAMILY, FRIENDS, OR NEIGHBORS?: ONCE A WEEK
HOW HARD IS IT FOR YOU TO PAY FOR THE VERY BASICS LIKE FOOD, HOUSING, MEDICAL CARE, AND HEATING?: NOT VERY HARD
HOW OFTEN DO YOU GET TOGETHER WITH FRIENDS OR RELATIVES?: ONCE A WEEK
HOW OFTEN DO YOU GET TOGETHER WITH FRIENDS OR RELATIVES?: ONCE A WEEK
HOW OFTEN DO YOU HAVE A DRINK CONTAINING ALCOHOL: 2-4 TIMES A MONTH
HOW MANY DRINKS CONTAINING ALCOHOL DO YOU HAVE ON A TYPICAL DAY WHEN YOU ARE DRINKING: 1 OR 2
WITHIN THE PAST 12 MONTHS, YOU WORRIED THAT YOUR FOOD WOULD RUN OUT BEFORE YOU GOT THE MONEY TO BUY MORE: NEVER TRUE
HOW OFTEN DO YOU ATTEND CHURCH OR RELIGIOUS SERVICES?: NEVER
HOW OFTEN DO YOU ATTENT MEETINGS OF THE CLUB OR ORGANIZATION YOU BELONG TO?: MORE THAN 4 TIMES PER YEAR
HOW OFTEN DO YOU ATTENT MEETINGS OF THE CLUB OR ORGANIZATION YOU BELONG TO?: MORE THAN 4 TIMES PER YEAR
IN A TYPICAL WEEK, HOW MANY TIMES DO YOU TALK ON THE PHONE WITH FAMILY, FRIENDS, OR NEIGHBORS?: ONCE A WEEK

## 2021-11-17 ASSESSMENT — LIFESTYLE VARIABLES
HOW OFTEN DO YOU HAVE SIX OR MORE DRINKS ON ONE OCCASION: NEVER
HOW MANY STANDARD DRINKS CONTAINING ALCOHOL DO YOU HAVE ON A TYPICAL DAY: 1 OR 2
HOW OFTEN DO YOU HAVE A DRINK CONTAINING ALCOHOL: 2-4 TIMES A MONTH

## 2021-11-23 ENCOUNTER — OFFICE VISIT (OUTPATIENT)
Dept: MEDICAL GROUP | Facility: MEDICAL CENTER | Age: 71
End: 2021-11-23
Payer: MEDICARE

## 2021-11-23 VITALS
HEART RATE: 86 BPM | TEMPERATURE: 98.2 F | DIASTOLIC BLOOD PRESSURE: 74 MMHG | HEIGHT: 64 IN | RESPIRATION RATE: 20 BRPM | WEIGHT: 165.79 LBS | BODY MASS INDEX: 28.3 KG/M2 | SYSTOLIC BLOOD PRESSURE: 102 MMHG | OXYGEN SATURATION: 92 %

## 2021-11-23 DIAGNOSIS — F33.42 RECURRENT MAJOR DEPRESSIVE DISORDER, IN FULL REMISSION (HCC): ICD-10-CM

## 2021-11-23 DIAGNOSIS — Z00.00 MEDICARE ANNUAL WELLNESS VISIT, SUBSEQUENT: Primary | ICD-10-CM

## 2021-11-23 DIAGNOSIS — R94.4 DECREASED GFR: ICD-10-CM

## 2021-11-23 PROCEDURE — G0439 PPPS, SUBSEQ VISIT: HCPCS | Performed by: FAMILY MEDICINE

## 2021-11-23 RX ORDER — HYDROXYZINE HYDROCHLORIDE 10 MG/1
10 TABLET, FILM COATED ORAL 3 TIMES DAILY PRN
Qty: 30 TABLET | Refills: 0 | Status: SHIPPED | OUTPATIENT
Start: 2021-11-23 | End: 2023-05-17

## 2021-11-23 ASSESSMENT — FIBROSIS 4 INDEX: FIB4 SCORE: 1.452966314513557851

## 2021-11-23 ASSESSMENT — ACTIVITIES OF DAILY LIVING (ADL): BATHING_REQUIRES_ASSISTANCE: 0

## 2021-11-23 ASSESSMENT — ENCOUNTER SYMPTOMS: GENERAL WELL-BEING: EXCELLENT

## 2021-11-23 NOTE — PROGRESS NOTES
Chief Complaint   Patient presents with   • Annual Wellness Visit       HPI:  Griselda is a 71 y.o. here for Medicare Annual Wellness Visit      Patient Active Problem List    Diagnosis Date Noted   • Osteopenia 10/08/2021   • Dyslipidemia 10/08/2021   • Decreased GFR 10/08/2021   • BMI 28.0-28.9,adult 08/10/2021   • Recurrent major depressive disorder, in full remission (HCC) 08/10/2021   • Primary insomnia 08/10/2021   • Arthrosis of hip 11/21/2016       Current Outpatient Medications   Medication Sig Dispense Refill   • hydrOXYzine HCl (ATARAX) 10 MG Tab Take 1 Tablet by mouth 3 times a day as needed for Anxiety. 30 Tablet 0   • venlafaxine (EFFEXOR-XR) 150 MG extended-release capsule Take 1 capsule by mouth every day. 90 capsule 2   • traZODone (DESYREL) 50 MG Tab Take 1 tablet by mouth at bedtime. 90 tablet 2     No current facility-administered medications for this visit.        Patient is taking medications as noted in medication list.  Current supplements as per medication list.     Allergies: Bee    Current social contact/activities: pt. States she is a member of the [employer].     Is patient current with immunizations? Yes.    She  reports that she has never smoked. She has never used smokeless tobacco. She reports current alcohol use of about 0.6 oz of alcohol per week. She reports that she does not use drugs.  Counseling given: Not Answered      DPA/Advanced directive: Patient has Advanced Directive, but it is not on file. Instructed to bring in a copy to scan into their chart.    ROS:    Gait: Uses no assistive device   Ostomy: No   Other tubes: No   Amputations: No   Chronic oxygen use No   Last eye exam pt. States it was about 6 months ago   Wears hearing aids: No   : Reports urinary leakage during the last 6 months that has not interfered at all with their daily activities or sleep.        Depression Screening - Pt declined    Interpretation of PHQ-9 Total Score   Score Severity   1-4 No Depression    5-9 Mild Depression   10-14 Moderate Depression   15-19 Moderately Severe Depression   20-27 Severe Depression      Screening for Cognitive Impairment  Three Minute Recall (captain, joel, picture)  3/3    Draw clock face with all 12 numbers and set the hands to show 5 past 8.  Yes 5/5  If cognitive concerns identified, deferred for follow up unless specifically addressed in assessment and plan.    Fall Risk Assessment  Has the patient had two or more falls in the last year or any fall with injury in the last year?  No  If fall risk identified, deferred for follow up unless specifically addressed in assessment and plan.    Safety Assessment  Throw rugs on floor.  No  Handrails on all stairs.  Yes  Good lighting in all hallways.  Yes  Difficulty hearing.  No  Patient counseled about all safety risks that were identified.    Functional Assessment ADLs  Are there any barriers preventing you from cooking for yourself or meeting nutritional needs?  No.    Are there any barriers preventing you from driving safely or obtaining transportation?  No.    Are there any barriers preventing you from using a telephone or calling for help?  No.    Are there any barriers preventing you from shopping?  No.    Are there any barriers preventing you from taking care of your own finances?  No.    Are there any barriers preventing you from managing your medications?    No.    Are there any barriers preventing you from showering, bathing or dressing yourself?  No.    Are you currently engaging in any exercise or physical activity?  Yes.  Pt. States she does aerobics. She also lifts weights.  What is your perception of your health?  Excellent.    Health Maintenance Summary          Overdue - COLORECTAL CANCER SCREENING (COLONOSCOPY - Every 10 Years) Overdue - never done    No completion history exists for this topic.          Overdue - PAP SMEAR (Every 3 Years) Overdue - never done    No completion history exists for this topic.           IMM ZOSTER VACCINES (3 of 3) Due soon on 12/3/2021    10/08/2021  Imm Admin: Zoster Vaccine Recombinant (RZV) (SHINGRIX)    2015  Imm Admin: Zoster Vaccine Live (ZVL) (Zostavax) - HISTORICAL DATA          MAMMOGRAM (Yearly) Next due on 2021  MA-SCREENING MAMMO BILAT W/TOMOSYNTHESIS W/CAD    2010  MA-SCREEING MAMMOGRAM W/ CAD    2009  MA-SCREENING DIGITAL MAMMO    2007  MA-SCREENING DIGITAL MAMMO    2007  MA-CAD SCREENING-MAMMO          IMM DTaP/Tdap/Td Vaccine (2 - Td or Tdap) Next due on 2023  Imm Admin: Tdap Vaccine          BONE DENSITY (Every 5 Years) Next due on 2021  DS-BONE DENSITY STUDY (DEXA)          IMM HEP B VACCINE (Series Information) Aged Out    2015  Imm Admin: Hep A/HEP B Combined Vaccine (TwinRix)    2015  Imm Admin: Hep A/HEP B Combined Vaccine (TwinRix)          IMM PNEUMOCOCCAL VACCINE: 65+ Years (Series Information) Completed    08/10/2021  Imm Admin: Pneumococcal polysaccharide vaccine (PPSV-23)    2017  Imm Admin: Pneumococcal Conjugate Vaccine (Prevnar/PCV-13)          COVID-19 Vaccine (Series Information) Completed    2021  Imm Admin: Moderna SARS-CoV-2 Vaccine    2021  Imm Admin: Moderna SARS-CoV-2 Vaccine    2021  Imm Admin: Moderna SARS-CoV-2 Vaccine          HEPATITIS C SCREENING  Completed    10/01/2021  HCV Scrn ( [id number] 1xLife)          IMM INFLUENZA (Series Information) Completed    10/08/2021  Imm Admin: Influenza Vaccine Adult HD    10/09/2020  Imm Admin: Influenza Vaccine Quad Inj (Pf)    2019  Imm Admin: Influenza Vaccine Adult HD    2018  Imm Admin: Influenza Vaccine Adult HD    2018  Imm Admin: Influenza Vaccine Adult HD    Only the first 5 history entries have been loaded, but more history exists.          Annual Wellness Visit  Completed    2021  Visit Dx: Medicare annual wellness visit, subsequent    2021  Level of  "Service: ANNUAL WELLNESS VISIT-INCLUDES PPPS SUBSEQUE*          IMM MENINGOCOCCAL VACCINE (MCV4) (Series Information) Aged Out    No completion history exists for this topic.                Patient Care Team:  Margot Garcia M.D. as PCP - General (Family Medicine)    Social History     Tobacco Use   • Smoking status: Never Smoker   • Smokeless tobacco: Never Used   Vaping Use   • Vaping Use: Never used   Substance Use Topics   • Alcohol use: Yes     Alcohol/week: 0.6 oz     Types: 1 Standard drinks or equivalent per week     Comment: Social drinker only   • Drug use: No     Family History   Problem Relation Age of Onset   • Hypertension Mother    • Psychiatric Illness Mother         Depression   • Cancer Father         Colon   • Colorectal Cancer Father      She  has a past medical history of Bowel habit changes, Pain (16), and Psychiatric problem.   Past Surgical History:   Procedure Laterality Date   • HIP ARTHROPLASTY TOTAL Right 2016    Procedure: HIP ARTHROPLASTY TOTAL;  Surgeon: Les Dyson M.D.;  Location: SURGERY [facility];  Service:    • OTHER Bilateral     foot surgery   • GYN SURGERY  ;        • LUMPECTOMY           Exam:   /74 (BP Location: Left arm, Patient Position: Sitting, BP Cuff Size: Adult long)   Pulse 86   Temp 36.8 °C (98.2 °F) (Temporal)   Resp 20   Ht 1.626 m (5' 4\")   Wt 75.2 kg (165 lb 12.6 oz)   SpO2 92%  Body mass index is 28.46 kg/m².    Hearing good.    Dentition good  Alert, oriented in no acute distress.  Eye contact is good, speech goal directed, affect calm      Assessment and Plan. The following treatment and monitoring plan is recommended:   1. Medicare annual wellness visit, subsequent  - Colonoscopy at [facility] 2020, recall 5 years; mammogram UTD, DEXA UTD, patient has not had pap for many years; she will be scheduled for pap    2. Recurrent major depressive disorder, in full remission (HCC)  - Patient reports most of her " symptoms stem from discord between her sons. She is looking for a therapist.   - Continue venlafaxine  - hydrOXYzine HCl (ATARAX) 10 MG Tab; Take 1 Tablet by mouth 3 times a day as needed for Anxiety.  Dispense: 30 Tablet; Refill: 0     3. Decreased GFR  Unclear etiology, patient does not have history of hypertension, diabetes, does not use NSAIDs frequently.  - US-RENAL; Future        Services suggested: No services needed at this time  Health Care Screening recommendations as per orders if indicated.  Referrals offered: PT/OT/Nutrition counseling/Behavioral Health/Smoking cessation as per orders if indicated.    Discussion today about general wellness and lifestyle habits:    · Prevent falls and reduce trip hazards; Cautioned about securing or removing rugs.  · Have a working fire alarm and carbon monoxide detector;   · Engage in regular physical activity and social activities     Follow-up: Return in about 1 month (around 12/23/2021) for Pap.

## 2021-12-03 ENCOUNTER — HOSPITAL ENCOUNTER (OUTPATIENT)
Dept: RADIOLOGY | Facility: MEDICAL CENTER | Age: 71
End: 2021-12-03
Attending: FAMILY MEDICINE
Payer: MEDICARE

## 2021-12-03 ENCOUNTER — TELEPHONE (OUTPATIENT)
Dept: MEDICAL GROUP | Facility: MEDICAL CENTER | Age: 71
End: 2021-12-03

## 2021-12-03 DIAGNOSIS — R94.4 DECREASED GFR: ICD-10-CM

## 2021-12-03 PROCEDURE — 76775 US EXAM ABDO BACK WALL LIM: CPT

## 2021-12-06 NOTE — TELEPHONE ENCOUNTER
Pt. Has an upcoming appointment on 12/10 and wants to know if there was any severity in her imaging and if she needed to be seen sooner.

## 2021-12-07 NOTE — TELEPHONE ENCOUNTER
Phone Number Called: [phone] (home)     Call outcome: Left detailed message for patient. Informed to call back with any additional questions.    Message: M informing pt. ''No there are no urgently concerning findings, I will see her 12/10/21. ''

## 2021-12-10 ENCOUNTER — HOSPITAL ENCOUNTER (OUTPATIENT)
Facility: MEDICAL CENTER | Age: 71
End: 2021-12-10
Attending: FAMILY MEDICINE
Payer: MEDICARE

## 2021-12-10 ENCOUNTER — OFFICE VISIT (OUTPATIENT)
Dept: MEDICAL GROUP | Facility: MEDICAL CENTER | Age: 71
End: 2021-12-10
Payer: MEDICARE

## 2021-12-10 VITALS
DIASTOLIC BLOOD PRESSURE: 82 MMHG | BODY MASS INDEX: 28.27 KG/M2 | WEIGHT: 165.57 LBS | HEART RATE: 83 BPM | RESPIRATION RATE: 16 BRPM | HEIGHT: 64 IN | SYSTOLIC BLOOD PRESSURE: 112 MMHG | OXYGEN SATURATION: 95 % | TEMPERATURE: 98.9 F

## 2021-12-10 DIAGNOSIS — Z23 NEED FOR VACCINATION: ICD-10-CM

## 2021-12-10 DIAGNOSIS — N28.1 RENAL CYST: ICD-10-CM

## 2021-12-10 DIAGNOSIS — Z01.419 WELL WOMAN EXAM: ICD-10-CM

## 2021-12-10 DIAGNOSIS — Z11.51 SCREENING FOR HPV (HUMAN PAPILLOMAVIRUS): ICD-10-CM

## 2021-12-10 DIAGNOSIS — Z12.4 SCREENING FOR CERVICAL CANCER: ICD-10-CM

## 2021-12-10 PROCEDURE — 99397 PER PM REEVAL EST PAT 65+ YR: CPT | Mod: 25 | Performed by: FAMILY MEDICINE

## 2021-12-10 PROCEDURE — 90750 HZV VACC RECOMBINANT IM: CPT | Performed by: FAMILY MEDICINE

## 2021-12-10 PROCEDURE — 88175 CYTOPATH C/V AUTO FLUID REDO: CPT

## 2021-12-10 PROCEDURE — 90471 IMMUNIZATION ADMIN: CPT | Performed by: FAMILY MEDICINE

## 2021-12-10 PROCEDURE — 99000 SPECIMEN HANDLING OFFICE-LAB: CPT | Performed by: FAMILY MEDICINE

## 2021-12-10 PROCEDURE — 87624 HPV HI-RISK TYP POOLED RSLT: CPT

## 2021-12-10 ASSESSMENT — FIBROSIS 4 INDEX: FIB4 SCORE: 1.452966314513557851

## 2021-12-10 NOTE — PROGRESS NOTES
Subjective:     CC:   Chief Complaint   Patient presents with   • Gynecologic Exam       HPI:   Griselda Obregon is a 71 y.o. female who presents for annual exam. She is feeling well and denies any complaints at this time.    Ob-Gyn/ History:    Patient has GYN provider: No  /Para:    Last Pap Smear:  Years ago. No history of abnormal pap smears.  Gyn Surgery:  CS.  She is not currently sexually active.  Last menstrual period: Age 51  No significant bloating/fluid retention, pelvic pain, or dyspareunia. No vaginal discharge  Post-menopausal bleeding: No  Urinary incontinence: minimal    Health Maintenance  Osteoporosis Screen/ DEXA: UTD  PT/vit D for falls prevention: Patient reports diet adequate in vit D, daily weight bearing exercise   Cholesterol Screening: completed 10/1/21  Diabetes Screening: completed 10/1/21   Aspirin Use: NA    Diet: Griselda maintains a healthy diet   Exercise: Regular exercise   Substance Abuse: None     Cancer screening  Colorectal Cancer Screening: UTD    Lung Cancer Screening: NA    Cervical Cancer Screening: Completed today   Breast Cancer Screening: UTD     Infectious disease screening/Immunizations  --Hepatitis C Screening: NR 10/1/21  --Immunizations: UTD        She  has a past medical history of Bowel habit changes, Pain (16), and Psychiatric problem.  She  has a past surgical history that includes other (Bilateral, ); gyn surgery (; ); hip arthroplasty total (Right, 2016); and lumpectomy.    Family History   Problem Relation Age of Onset   • Hypertension Mother    • Psychiatric Illness Mother         Depression   • Cancer Father         Colon   • Colorectal Cancer Father        Social History     Socioeconomic History   • Marital status: Single     Spouse name: Not on file   • Number of children: Not on file   • Years of education: Not on file   • Highest education level: Master's degree (e.g., MA, MS, Tom, MEd, MSW, ARIANA)   Occupational  History   • Not on file   Tobacco Use   • Smoking status: Never Smoker   • Smokeless tobacco: Never Used   Vaping Use   • Vaping Use: Never used   Substance and Sexual Activity   • Alcohol use: Yes     Alcohol/week: 0.6 oz     Types: 1 Standard drinks or equivalent per week     Comment: Social drinker only   • Drug use: No   • Sexual activity: Not Currently     Partners: Male     Birth control/protection: Post-Menopausal   Other Topics Concern   • Not on file   Social History Narrative   • Not on file     Social Determinants of Health     Financial Resource Strain: Low Risk    • Difficulty of Paying Living Expenses: Not very hard   Food Insecurity: No Food Insecurity   • Worried About Running Out of Food in the Last Year: Never true   • Ran Out of Food in the Last Year: Never true   Transportation Needs: No Transportation Needs   • Lack of Transportation (Medical): No   • Lack of Transportation (Non-Medical): No   Physical Activity: Sufficiently Active   • Days of Exercise per Week: 3 days   • Minutes of Exercise per Session: 50 min   Stress: No Stress Concern Present   • Feeling of Stress : Only a little   Social Connections: Socially Isolated   • Frequency of Communication with Friends and Family: Once a week   • Frequency of Social Gatherings with Friends and Family: Once a week   • Attends Restoration Services: Never   • Active Member of Clubs or Organizations: Yes   • Attends Club or Organization Meetings: More than 4 times per year   • Marital Status:    Intimate Partner Violence:    • Fear of Current or Ex-Partner: Not on file   • Emotionally Abused: Not on file   • Physically Abused: Not on file   • Sexually Abused: Not on file   Housing Stability: Low Risk    • Unable to Pay for Housing in the Last Year: No   • Number of Places Lived in the Last Year: 1   • Unstable Housing in the Last Year: No       Patient Active Problem List    Diagnosis Date Noted   • Osteopenia 10/08/2021   • Dyslipidemia  "10/08/2021   • Decreased GFR 10/08/2021   • BMI 28.0-28.9,adult 08/10/2021   • Recurrent major depressive disorder, in full remission (HCC) 08/10/2021   • Primary insomnia 08/10/2021   • Arthrosis of hip 11/21/2016         Current Outpatient Medications   Medication Sig Dispense Refill   • hydrOXYzine HCl (ATARAX) 10 MG Tab Take 1 Tablet by mouth 3 times a day as needed for Anxiety. 30 Tablet 0   • venlafaxine (EFFEXOR-XR) 150 MG extended-release capsule Take 1 capsule by mouth every day. 90 capsule 2   • traZODone (DESYREL) 50 MG Tab Take 1 tablet by mouth at bedtime. 90 tablet 2     No current facility-administered medications for this visit.     Allergies   Allergen Reactions   • Bee Anaphylaxis       Review of Systems   Constitutional: Negative for fever and chills  HENT: Negative for congestion.    Eyes: Negative for blurry vision.  Respiratory: Negative for cough and shortness of breath.    Cardiovascular: Negative for chest pain and leg swelling.   Gastrointestinal: Negative for nausea, vomiting, abdominal pain and diarrhea.   Genitourinary: Negative for dysuria and hematuria.   Skin: Negative for rash.   Neurological: Negative for dizziness, focal weakness and headaches.   Heme: Does not bruise/bleed easily.   Psychiatric/Behavioral: Negative for depression.  The patient is not nervous/anxious.      Objective:     /82 (BP Location: Left arm, Patient Position: Sitting, BP Cuff Size: Adult)   Pulse 83   Temp 37.2 °C (98.9 °F) (Temporal)   Resp 16   Ht 1.626 m (5' 4\")   Wt 75.1 kg (165 lb 9.1 oz)   SpO2 95%   BMI 28.42 kg/m²   Body mass index is 28.42 kg/m².  Wt Readings from Last 4 Encounters:   12/10/21 75.1 kg (165 lb 9.1 oz)   11/23/21 75.2 kg (165 lb 12.6 oz)   10/08/21 77.1 kg (170 lb)   08/10/21 76.6 kg (168 lb 12.8 oz)       Physical Exam:  Constitutional: Well-developed, well-nourished. Appears staged age.   Skin: Skin is warm and dry. No rash noted.   Head: Atraumatic without obvious " lesions.  Eyes: Conjunctivae and extraocular motions intact. Pupils are equal, round, and reactive to light. No scleral icterus.   Cardiovascular: Regular rate and rhythm, S1 and S2 without murmur, rubs, or gallops.  Lungs: Normal inspiratory effort, CTA bilaterally, no wheezes/rhonchi/rales  Abdomen: Soft, non tender, and without distention. Active bowel sounds. No rebound or guarding.  :Perineum and external genitalia normal without rash. Vagina with normal and physiologic discharge. Cervix without visible lesions or discharge.   Extremities: No cyanosis, clubbing, erythema, nor edema. Distal pulses intact and symmetric.   Neurological: Alert and oriented x 3.   Psychiatric:  Behavior, euthymic affect      Assessment and Plan:     1. Well woman exam  HCM:  Pap completed today - discussed if normal we can likely d/c future paps; mammo UTD, colonoscopy UTD   Labs: Recently completed  Immunizations: UTD  Discussed diet, exercise, and screenings    2. Screening for cervical cancer  - THINPREP PAP WITH HPV; Future    3. Screening for HPV (human papillomavirus)  - THINPREP PAP WITH HPV; Future    4. Need for vaccination  - Shingles Vaccine (Shingrix)      Follow-up: Return in about 6 months (around 6/10/2022).

## 2021-12-11 DIAGNOSIS — Z12.4 SCREENING FOR CERVICAL CANCER: ICD-10-CM

## 2021-12-11 DIAGNOSIS — Z11.51 SCREENING FOR HPV (HUMAN PAPILLOMAVIRUS): ICD-10-CM

## 2021-12-13 ENCOUNTER — TELEPHONE (OUTPATIENT)
Dept: MEDICAL GROUP | Facility: MEDICAL CENTER | Age: 71
End: 2021-12-13

## 2021-12-13 LAB
CYTOLOGY REG CYTOL: ABNORMAL
HPV HR 12 DNA CVX QL NAA+PROBE: POSITIVE
HPV16 DNA SPEC QL NAA+PROBE: NEGATIVE
HPV18 DNA SPEC QL NAA+PROBE: NEGATIVE
SPECIMEN SOURCE: ABNORMAL

## 2021-12-13 NOTE — TELEPHONE ENCOUNTER
Phone Number Called: [phone] (home)     Call outcome: Spoke to patient regarding message below.    Message: pt. Called earlier stating she has an update for you concerning her long term care insurance.     Spoke with patient she states she is concerned with the cyst found on Ultrasound. She wanted to remind you about how it is worded. She does remember you said it wasn't serious. She now wants to wait on the referral in the meantime.

## 2021-12-15 ENCOUNTER — OFFICE VISIT (OUTPATIENT)
Dept: MEDICAL GROUP | Facility: MEDICAL CENTER | Age: 71
End: 2021-12-15
Payer: MEDICARE

## 2021-12-15 VITALS
OXYGEN SATURATION: 96 % | DIASTOLIC BLOOD PRESSURE: 86 MMHG | WEIGHT: 164.9 LBS | HEIGHT: 64 IN | RESPIRATION RATE: 18 BRPM | SYSTOLIC BLOOD PRESSURE: 118 MMHG | HEART RATE: 55 BPM | BODY MASS INDEX: 28.15 KG/M2 | TEMPERATURE: 98.5 F

## 2021-12-15 DIAGNOSIS — B97.7 HIGH RISK HPV INFECTION: ICD-10-CM

## 2021-12-15 PROCEDURE — 99213 OFFICE O/P EST LOW 20 MIN: CPT | Performed by: FAMILY MEDICINE

## 2021-12-15 ASSESSMENT — FIBROSIS 4 INDEX: FIB4 SCORE: 1.452966314513557851

## 2021-12-15 NOTE — TELEPHONE ENCOUNTER
Called patient, left VM for patient to return my call at her convenience regarding US as well as her recent pap smear result.

## 2021-12-16 NOTE — PROGRESS NOTES
"Subjective:     CC: follow up pap smear results    HPI:   Griselda presents today with     High risk HPV infection  Griselda presented for well woman exam 12/10/21; she had not had a pap in many years thus we decided to proceed with pap smear. Her pathology was negative for intraepithelial lesion, negative for HPV 16, 18, however positive for other high risk HPV genotypes. Griselda is not currently sexually active however reports her ex- was a bit promiscuous.       Past Medical History:   Diagnosis Date   • Bowel habit changes     constipation   • Pain 11/8/16    Right hip   • Psychiatric problem     depression       Social History     Tobacco Use   • Smoking status: Never Smoker   • Smokeless tobacco: Never Used   Vaping Use   • Vaping Use: Never used   Substance Use Topics   • Alcohol use: Yes     Alcohol/week: 0.6 oz     Types: 1 Standard drinks or equivalent per week     Comment: Social drinker only   • Drug use: No       Current Outpatient Medications Ordered in Epic   Medication Sig Dispense Refill   • hydrOXYzine HCl (ATARAX) 10 MG Tab Take 1 Tablet by mouth 3 times a day as needed for Anxiety. 30 Tablet 0   • venlafaxine (EFFEXOR-XR) 150 MG extended-release capsule Take 1 capsule by mouth every day. 90 capsule 2   • traZODone (DESYREL) 50 MG Tab Take 1 tablet by mouth at bedtime. 90 tablet 2     No current Epic-ordered facility-administered medications on file.       Allergies:  Bee    Health Maintenance: UTD    ROS:  Gen: no fevers/chills, no changes in weight  Eyes: no changes in vision  ENT: no sore throat, no hearing loss, no bloody nose  Pulm: no SOB  CV: no chest pain    Objective:       Exam:  /86 (BP Location: Left arm, Patient Position: Sitting, BP Cuff Size: Adult)   Pulse (!) 55   Temp 36.9 °C (98.5 °F) (Temporal)   Resp 18   Ht 1.626 m (5' 4\")   Wt 74.8 kg (164 lb 14.5 oz)   SpO2 96%   BMI 28.31 kg/m²  Body mass index is 28.31 kg/m².    Constitutional: Alert, no distress, " well-groomed  Skin: Warm, dry, good turgor, no rashes in visible areas  Eyes: Equal, round and reactive, conjunctiva clear, no ptosis  Respiratory: Unlabored respiratory effort  Psych: Alert and oriented, normal affect and mood    Assessment & Plan:     71 y.o. female with the following -     1. High risk HPV infection  - Referral to Gynecology  Pap completed 12/10/21, pathology negative for intraepithelial lesion however positive for other high risk HPV genotypes. Reassured patient pathology negative for cellular changes however I would recommend follow up with gynecology sometime in the next three months to discuss possible colposcopy. She is amenable to this plan.     Return in about 6 months (around 6/15/2022).    Please note this dictation was created using voice recognition software. I have made every reasonable attempt to correct obvious errors, but I expect there may be errors of grammar, and possibly content, that I did not discover before finalizing the note.

## 2021-12-16 NOTE — ASSESSMENT & PLAN NOTE
Griselda presented for well woman exam 12/10/21; she had not had a pap in many years thus we decided to proceed with pap smear. Her pathology was negative for intraepithelial lesion, negative for HPV 16, 18, however positive for other high risk HPV genotypes. Griselda is not currently sexually active however reports her ex- was a bit promiscuous.

## 2022-01-06 NOTE — NON-PROVIDER
Comprehensive Health Assessment. (no-show) Member declined, not interested at this time.  Attempt #2

## 2022-02-10 ENCOUNTER — PATIENT MESSAGE (OUTPATIENT)
Dept: HEALTH INFORMATION MANAGEMENT | Facility: OTHER | Age: 72
End: 2022-02-10
Payer: MEDICARE

## 2022-02-16 ENCOUNTER — TELEPHONE (OUTPATIENT)
Dept: MEDICAL GROUP | Facility: MEDICAL CENTER | Age: 72
End: 2022-02-16
Payer: MEDICARE

## 2022-02-17 NOTE — TELEPHONE ENCOUNTER
Phone Number Called: [phone] (home)       Call outcome: Spoke to patient regarding message below.    Message: pt. Called in regards to her HPV infection. Called patient back and informed her that she was referred to Urology and Gynecology. Gave patient the numbers for both groups and she verbalized understanding.

## 2022-03-02 ENCOUNTER — TELEPHONE (OUTPATIENT)
Dept: MEDICAL GROUP | Facility: MEDICAL CENTER | Age: 72
End: 2022-03-02
Payer: MEDICARE

## 2022-03-02 NOTE — TELEPHONE ENCOUNTER
Pt. Called and informed that DMV form filled out and left up front for . She verbalized understanding.

## 2022-03-28 ENCOUNTER — HOSPITAL ENCOUNTER (OUTPATIENT)
Facility: MEDICAL CENTER | Age: 72
End: 2022-03-28
Attending: STUDENT IN AN ORGANIZED HEALTH CARE EDUCATION/TRAINING PROGRAM
Payer: MEDICARE

## 2022-03-28 PROCEDURE — 81001 URINALYSIS AUTO W/SCOPE: CPT

## 2022-03-29 LAB
APPEARANCE UR: CLEAR
BACTERIA #/AREA URNS HPF: NEGATIVE /HPF
BILIRUB UR QL STRIP.AUTO: NEGATIVE
COLOR UR: ABNORMAL
EPI CELLS #/AREA URNS HPF: ABNORMAL /HPF
GLUCOSE UR STRIP.AUTO-MCNC: NEGATIVE MG/DL
HYALINE CASTS #/AREA URNS LPF: ABNORMAL /LPF
KETONES UR STRIP.AUTO-MCNC: ABNORMAL MG/DL
LEUKOCYTE ESTERASE UR QL STRIP.AUTO: ABNORMAL
MICRO URNS: ABNORMAL
NITRITE UR QL STRIP.AUTO: NEGATIVE
PH UR STRIP.AUTO: 5.5 [PH] (ref 5–8)
PROT UR QL STRIP: NEGATIVE MG/DL
RBC # URNS HPF: ABNORMAL /HPF
RBC UR QL AUTO: NEGATIVE
SP GR UR STRIP.AUTO: 1.02
UROBILINOGEN UR STRIP.AUTO-MCNC: 0.2 MG/DL
WBC #/AREA URNS HPF: ABNORMAL /HPF

## 2022-04-01 ENCOUNTER — HOSPITAL ENCOUNTER (OUTPATIENT)
Facility: MEDICAL CENTER | Age: 72
End: 2022-04-01
Attending: STUDENT IN AN ORGANIZED HEALTH CARE EDUCATION/TRAINING PROGRAM
Payer: MEDICARE

## 2022-04-01 LAB — CYTOLOGY REG CYTOL: NORMAL

## 2022-04-01 PROCEDURE — 88112 CYTOPATH CELL ENHANCE TECH: CPT

## 2022-04-08 ENCOUNTER — HOSPITAL ENCOUNTER (OUTPATIENT)
Dept: RADIOLOGY | Facility: MEDICAL CENTER | Age: 72
End: 2022-04-08
Attending: STUDENT IN AN ORGANIZED HEALTH CARE EDUCATION/TRAINING PROGRAM
Payer: MEDICARE

## 2022-04-08 DIAGNOSIS — N28.1 ACQUIRED CYST OF KIDNEY: ICD-10-CM

## 2022-04-08 PROCEDURE — 74178 CT ABD&PLV WO CNTR FLWD CNTR: CPT | Mod: MH

## 2022-04-08 PROCEDURE — 700117 HCHG RX CONTRAST REV CODE 255: Performed by: STUDENT IN AN ORGANIZED HEALTH CARE EDUCATION/TRAINING PROGRAM

## 2022-04-08 RX ADMIN — IOHEXOL 80 ML: 350 INJECTION, SOLUTION INTRAVENOUS at 09:34

## 2022-05-24 ENCOUNTER — TELEPHONE (OUTPATIENT)
Dept: HEALTH INFORMATION MANAGEMENT | Facility: OTHER | Age: 72
End: 2022-05-24
Payer: MEDICARE

## 2022-09-30 ENCOUNTER — HOSPITAL ENCOUNTER (OUTPATIENT)
Dept: LAB | Facility: MEDICAL CENTER | Age: 72
End: 2022-09-30
Attending: UROLOGY
Payer: MEDICARE

## 2022-09-30 LAB
BUN SERPL-MCNC: 18 MG/DL (ref 8–22)
CREAT SERPL-MCNC: 0.84 MG/DL (ref 0.5–1.4)
GFR SERPLBLD CREATININE-BSD FMLA CKD-EPI: 74 ML/MIN/1.73 M 2

## 2022-09-30 PROCEDURE — 84520 ASSAY OF UREA NITROGEN: CPT

## 2022-09-30 PROCEDURE — 36415 COLL VENOUS BLD VENIPUNCTURE: CPT

## 2022-09-30 PROCEDURE — 82565 ASSAY OF CREATININE: CPT

## 2022-10-03 ENCOUNTER — HOSPITAL ENCOUNTER (OUTPATIENT)
Dept: RADIOLOGY | Facility: MEDICAL CENTER | Age: 72
End: 2022-10-03
Attending: STUDENT IN AN ORGANIZED HEALTH CARE EDUCATION/TRAINING PROGRAM
Payer: MEDICARE

## 2022-10-03 DIAGNOSIS — N28.1 CYST OF KIDNEY, ACQUIRED: ICD-10-CM

## 2022-10-03 PROCEDURE — 700117 HCHG RX CONTRAST REV CODE 255: Performed by: STUDENT IN AN ORGANIZED HEALTH CARE EDUCATION/TRAINING PROGRAM

## 2022-10-03 PROCEDURE — 74170 CT ABD WO CNTRST FLWD CNTRST: CPT

## 2022-10-03 RX ADMIN — IOHEXOL 100 ML: 350 INJECTION, SOLUTION INTRAVENOUS at 13:49

## 2022-10-18 RX ORDER — VENLAFAXINE HYDROCHLORIDE 150 MG/1
150 CAPSULE, EXTENDED RELEASE ORAL DAILY
Qty: 90 CAPSULE | Refills: 2 | Status: SHIPPED | OUTPATIENT
Start: 2022-10-18 | End: 2023-08-03 | Stop reason: SDUPTHER

## 2022-10-18 RX ORDER — TRAZODONE HYDROCHLORIDE 50 MG/1
50 TABLET ORAL
Qty: 90 TABLET | Refills: 2 | Status: SHIPPED | OUTPATIENT
Start: 2022-10-18 | End: 2023-08-03 | Stop reason: SDUPTHER

## 2022-10-21 ENCOUNTER — OFFICE VISIT (OUTPATIENT)
Dept: MEDICAL GROUP | Facility: MEDICAL CENTER | Age: 72
End: 2022-10-21
Payer: MEDICARE

## 2022-10-21 VITALS
OXYGEN SATURATION: 95 % | DIASTOLIC BLOOD PRESSURE: 70 MMHG | HEART RATE: 85 BPM | HEIGHT: 64 IN | RESPIRATION RATE: 12 BRPM | SYSTOLIC BLOOD PRESSURE: 112 MMHG | TEMPERATURE: 98.5 F | BODY MASS INDEX: 27.83 KG/M2 | WEIGHT: 163 LBS

## 2022-10-21 DIAGNOSIS — F33.42 RECURRENT MAJOR DEPRESSIVE DISORDER, IN FULL REMISSION (HCC): ICD-10-CM

## 2022-10-21 DIAGNOSIS — E78.5 DYSLIPIDEMIA: ICD-10-CM

## 2022-10-21 DIAGNOSIS — B97.7 HIGH RISK HPV INFECTION: ICD-10-CM

## 2022-10-21 DIAGNOSIS — N28.1 BILATERAL RENAL CYSTS: ICD-10-CM

## 2022-10-21 PROCEDURE — 99214 OFFICE O/P EST MOD 30 MIN: CPT | Performed by: FAMILY MEDICINE

## 2022-10-21 ASSESSMENT — FIBROSIS 4 INDEX: FIB4 SCORE: 1.47

## 2022-10-21 ASSESSMENT — PATIENT HEALTH QUESTIONNAIRE - PHQ9: CLINICAL INTERPRETATION OF PHQ2 SCORE: 0

## 2022-10-21 NOTE — ASSESSMENT & PLAN NOTE
Labs demonstrate mild-mod elevation of LDL per below.  Griselda reports a very healthy Mediterranean type diet with minimal red meat, healthy fats. No family history of cardiovascular disease.    Lab Results   Component Value Date/Time    CHOLSTRLTOT 205 (H) 10/01/2021 11:05 AM     (H) 10/01/2021 11:05 AM    HDL 56 10/01/2021 11:05 AM    TRIGLYCERIDE 87 10/01/2021 11:05 AM

## 2022-10-21 NOTE — ASSESSMENT & PLAN NOTE
CT abd/pelvis 10/3/2022:  1.  Stable bilateral renal cysts, right greater than left, likely Bosniak 2 cysts. If continued clinical follow-up is desired, consider follow-up with ultrasound.  2.  Small nonobstructing left renal stone. No hydronephrosis.  3.  Colonic diverticulosis.  4.  Increased colonic stool could represent constipation.    Griselda has an appointment with urology.

## 2022-10-21 NOTE — PROGRESS NOTES
Subjective:     CC: Follow up    HPI:   Griselda presents today with:    Bilateral renal cysts  CT abd/pelvis 10/3/2022:  1.  Stable bilateral renal cysts, right greater than left, likely Bosniak 2 cysts. If continued clinical follow-up is desired, consider follow-up with ultrasound.  2.  Small nonobstructing left renal stone. No hydronephrosis.  3.  Colonic diverticulosis.  4.  Increased colonic stool could represent constipation.    Griselda has an appointment with urology.        Dyslipidemia  Labs demonstrate mild-mod elevation of LDL per below.  Griselda reports a very healthy Mediterranean type diet with minimal red meat, healthy fats. No family history of cardiovascular disease.    Lab Results   Component Value Date/Time    CHOLSTRLTOT 205 (H) 10/01/2021 11:05 AM     (H) 10/01/2021 11:05 AM    HDL 56 10/01/2021 11:05 AM    TRIGLYCERIDE 87 10/01/2021 11:05 AM           High risk HPV infection  Griselda presented for well woman exam 12/10/21; she had not had a pap in many years thus we decided to proceed with pap smear. Her pathology was negative for intraepithelial lesion, negative for HPV 16, 18, however positive for other high risk HPV genotypes. She had a follow up appointment with Dr. Tin Francois who recommended follow up in one year.    Recurrent major depressive disorder, in full remission (HCC)  Griselda reports she is doing well on venlafaxine XR 150mg daily.      Past Medical History:   Diagnosis Date    Bowel habit changes     constipation    Pain 11/8/16    Right hip    Psychiatric problem     depression       Social History     Tobacco Use    Smoking status: Never    Smokeless tobacco: Never   Vaping Use    Vaping Use: Never used   Substance Use Topics    Alcohol use: Yes     Alcohol/week: 0.6 oz     Types: 1 Standard drinks or equivalent per week     Comment: Social drinker only    Drug use: No       Current Outpatient Medications Ordered in Epic   Medication Sig Dispense Refill    venlafaxine  "(EFFEXOR-XR) 150 MG extended-release capsule TAKE 1 CAPSULE BY MOUTH EVERY DAY 90 Capsule 2    traZODone (DESYREL) 50 MG Tab TAKE 1 TABLET BY MOUTH AT BEDTIME 90 Tablet 2    hydrOXYzine HCl (ATARAX) 10 MG Tab Take 1 Tablet by mouth 3 times a day as needed for Anxiety. 30 Tablet 0     No current Epic-ordered facility-administered medications on file.       Allergies:  Bee    Health Maintenance:  Griselda will make MA visit for flu shot    ROS:  Gen: no fevers/chills, no changes in weight  Eyes: no changes in vision  ENT: no sore throat, no hearing loss, no bloody nose  Pulm: no SOB  CV: no chest pain      Objective:       Exam:  /70 (BP Location: Right arm, Patient Position: Sitting, BP Cuff Size: Adult)   Pulse 85   Temp 36.9 °C (98.5 °F) (Temporal)   Resp 12   Ht 1.626 m (5' 4\")   Wt 73.9 kg (163 lb)   SpO2 95%   BMI 27.98 kg/m²  Body mass index is 27.98 kg/m².    Gen: Alert and oriented, No apparent distress  Lungs: Normal effort, CTA bilaterally, no wheezes, rhonchi, or rales  CV: Regular rate and rhythm, no murmurs, rubs, or gallops    Assessment & Plan:     72 y.o. female with the following -     1. Bilateral renal cysts  Following with urology     2. Dyslipidemia  Continue healthy diet and exercise    3. High risk HPV infection  Following with Dr. Cespedes     4. Recurrent major depressive disorder, in full remission (HCC)  Stable, continue venlafaxine XR 150mg     F/U: Patient notified I will be leaving [facility]. I encouraged the patient to establish with new [facility] PCP. It has been such a pleasure and privilege caring for Griselda.      Please note this dictation was created using voice recognition software. I have made every reasonable attempt to correct obvious errors, but I expect there may be errors of grammar, and possibly content, that I did not discover before finalizing the note.         "

## 2022-10-21 NOTE — ASSESSMENT & PLAN NOTE
Griselda presented for well woman exam 12/10/21; she had not had a pap in many years thus we decided to proceed with pap smear. Her pathology was negative for intraepithelial lesion, negative for HPV 16, 18, however positive for other high risk HPV genotypes. She had a follow up appointment with Dr. Tin Francois who recommended follow up in one year.

## 2022-11-11 ENCOUNTER — PATIENT MESSAGE (OUTPATIENT)
Dept: HEALTH INFORMATION MANAGEMENT | Facility: OTHER | Age: 72
End: 2022-11-11

## 2023-04-10 ENCOUNTER — TELEPHONE (OUTPATIENT)
Dept: HEALTH INFORMATION MANAGEMENT | Facility: OTHER | Age: 73
End: 2023-04-10

## 2023-04-12 ENCOUNTER — HOSPITAL ENCOUNTER (OUTPATIENT)
Dept: RADIOLOGY | Facility: MEDICAL CENTER | Age: 73
End: 2023-04-12
Attending: FAMILY MEDICINE
Payer: MEDICARE

## 2023-04-12 DIAGNOSIS — Z12.31 VISIT FOR SCREENING MAMMOGRAM: ICD-10-CM

## 2023-04-12 PROCEDURE — 77063 BREAST TOMOSYNTHESIS BI: CPT

## 2023-05-12 ENCOUNTER — HOSPITAL ENCOUNTER (OUTPATIENT)
Dept: RADIOLOGY | Facility: MEDICAL CENTER | Age: 73
End: 2023-05-12
Attending: ORTHOPAEDIC SURGERY
Payer: MEDICARE

## 2023-05-12 DIAGNOSIS — M25.562 LEFT KNEE PAIN, UNSPECIFIED CHRONICITY: ICD-10-CM

## 2023-05-12 PROCEDURE — 73721 MRI JNT OF LWR EXTRE W/O DYE: CPT | Mod: LT

## 2023-05-17 ENCOUNTER — OFFICE VISIT (OUTPATIENT)
Dept: URGENT CARE | Facility: CLINIC | Age: 73
End: 2023-05-17
Payer: MEDICARE

## 2023-05-17 ENCOUNTER — HOSPITAL ENCOUNTER (EMERGENCY)
Facility: MEDICAL CENTER | Age: 73
End: 2023-05-17
Attending: EMERGENCY MEDICINE
Payer: MEDICARE

## 2023-05-17 ENCOUNTER — APPOINTMENT (OUTPATIENT)
Dept: RADIOLOGY | Facility: MEDICAL CENTER | Age: 73
End: 2023-05-17
Attending: EMERGENCY MEDICINE
Payer: MEDICARE

## 2023-05-17 VITALS
SYSTOLIC BLOOD PRESSURE: 124 MMHG | WEIGHT: 168.43 LBS | HEART RATE: 62 BPM | HEIGHT: 64 IN | OXYGEN SATURATION: 95 % | RESPIRATION RATE: 16 BRPM | DIASTOLIC BLOOD PRESSURE: 55 MMHG | BODY MASS INDEX: 28.76 KG/M2 | TEMPERATURE: 98.4 F

## 2023-05-17 VITALS
HEIGHT: 64 IN | WEIGHT: 160 LBS | DIASTOLIC BLOOD PRESSURE: 82 MMHG | BODY MASS INDEX: 27.31 KG/M2 | OXYGEN SATURATION: 98 % | SYSTOLIC BLOOD PRESSURE: 110 MMHG | HEART RATE: 68 BPM | TEMPERATURE: 98 F | RESPIRATION RATE: 16 BRPM

## 2023-05-17 DIAGNOSIS — S80.12XA HEMATOMA OF LEFT LOWER LEG: ICD-10-CM

## 2023-05-17 DIAGNOSIS — R22.42 LOCALIZED SWELLING OF LEFT LOWER LEG: ICD-10-CM

## 2023-05-17 LAB
ANION GAP SERPL CALC-SCNC: 10 MMOL/L (ref 7–16)
APTT PPP: 31 SEC (ref 24.7–36)
BASOPHILS # BLD AUTO: 0.5 % (ref 0–1.8)
BASOPHILS # BLD: 0.03 K/UL (ref 0–0.12)
BUN SERPL-MCNC: 15 MG/DL (ref 8–22)
CALCIUM SERPL-MCNC: 8.9 MG/DL (ref 8.4–10.2)
CHLORIDE SERPL-SCNC: 106 MMOL/L (ref 96–112)
CO2 SERPL-SCNC: 24 MMOL/L (ref 20–33)
CREAT SERPL-MCNC: 0.89 MG/DL (ref 0.5–1.4)
EOSINOPHIL # BLD AUTO: 0.1 K/UL (ref 0–0.51)
EOSINOPHIL NFR BLD: 1.7 % (ref 0–6.9)
ERYTHROCYTE [DISTWIDTH] IN BLOOD BY AUTOMATED COUNT: 45.8 FL (ref 35.9–50)
GFR SERPLBLD CREATININE-BSD FMLA CKD-EPI: 69 ML/MIN/1.73 M 2
GLUCOSE SERPL-MCNC: 75 MG/DL (ref 65–99)
HCT VFR BLD AUTO: 42.3 % (ref 37–47)
HGB BLD-MCNC: 14.1 G/DL (ref 12–16)
IMM GRANULOCYTES # BLD AUTO: 0.01 K/UL (ref 0–0.11)
IMM GRANULOCYTES NFR BLD AUTO: 0.2 % (ref 0–0.9)
INR PPP: 1.03 (ref 0.87–1.13)
LYMPHOCYTES # BLD AUTO: 1.85 K/UL (ref 1–4.8)
LYMPHOCYTES NFR BLD: 31.5 % (ref 22–41)
MCH RBC QN AUTO: 32.1 PG (ref 27–33)
MCHC RBC AUTO-ENTMCNC: 33.3 G/DL (ref 33.6–35)
MCV RBC AUTO: 96.4 FL (ref 81.4–97.8)
MONOCYTES # BLD AUTO: 0.49 K/UL (ref 0–0.85)
MONOCYTES NFR BLD AUTO: 8.3 % (ref 0–13.4)
NEUTROPHILS # BLD AUTO: 3.4 K/UL (ref 2–7.15)
NEUTROPHILS NFR BLD: 57.8 % (ref 44–72)
NRBC # BLD AUTO: 0 K/UL
NRBC BLD-RTO: 0 /100 WBC
PLATELET # BLD AUTO: 215 K/UL (ref 164–446)
PMV BLD AUTO: 10.3 FL (ref 9–12.9)
POTASSIUM SERPL-SCNC: 3.6 MMOL/L (ref 3.6–5.5)
PROTHROMBIN TIME: 13.4 SEC (ref 12–14.6)
RBC # BLD AUTO: 4.39 M/UL (ref 4.2–5.4)
SODIUM SERPL-SCNC: 140 MMOL/L (ref 135–145)
WBC # BLD AUTO: 5.9 K/UL (ref 4.8–10.8)

## 2023-05-17 PROCEDURE — 80048 BASIC METABOLIC PNL TOTAL CA: CPT

## 2023-05-17 PROCEDURE — 85025 COMPLETE CBC W/AUTO DIFF WBC: CPT

## 2023-05-17 PROCEDURE — 36415 COLL VENOUS BLD VENIPUNCTURE: CPT

## 2023-05-17 PROCEDURE — 3079F DIAST BP 80-89 MM HG: CPT | Performed by: STUDENT IN AN ORGANIZED HEALTH CARE EDUCATION/TRAINING PROGRAM

## 2023-05-17 PROCEDURE — 85610 PROTHROMBIN TIME: CPT

## 2023-05-17 PROCEDURE — 99284 EMERGENCY DEPT VISIT MOD MDM: CPT

## 2023-05-17 PROCEDURE — 85730 THROMBOPLASTIN TIME PARTIAL: CPT

## 2023-05-17 PROCEDURE — 99213 OFFICE O/P EST LOW 20 MIN: CPT | Performed by: STUDENT IN AN ORGANIZED HEALTH CARE EDUCATION/TRAINING PROGRAM

## 2023-05-17 PROCEDURE — 3074F SYST BP LT 130 MM HG: CPT | Performed by: STUDENT IN AN ORGANIZED HEALTH CARE EDUCATION/TRAINING PROGRAM

## 2023-05-17 PROCEDURE — 93971 EXTREMITY STUDY: CPT | Mod: LT

## 2023-05-17 RX ORDER — SPIRONOLACTONE 25 MG/1
TABLET ORAL
Status: ON HOLD | COMMUNITY
End: 2024-03-18

## 2023-05-17 RX ORDER — PROGESTERONE 100 MG/1
100 CAPSULE ORAL
COMMUNITY
Start: 2023-04-13 | End: 2023-08-03 | Stop reason: CLARIF

## 2023-05-17 ASSESSMENT — FIBROSIS 4 INDEX
FIB4 SCORE: 1.49
FIB4 SCORE: 1.49

## 2023-05-17 NOTE — PROGRESS NOTES
"Subjective     Griselda Shayy Obregon is a 73 y.o. female who presents with Knee Swelling (X today, Swelling Lt. Knee down to foot)            Griselda is a 73 y.o. female who presents to urgent care concerned for blood clot in left lower leg.  Patient states she called her orthopedic doctor and was advised that she go to ER for further evaluation.  Patient lower leg swelling started today.  Swelling from left knee down to ankle.  No injury/trauma.  Patient is experiencing calf pain/tenderness.  No redness or increased temperature.        ROS           Objective     /82 (BP Location: Left arm, Patient Position: Sitting, BP Cuff Size: Large adult)   Pulse 68   Temp 36.7 °C (98 °F) (Temporal)   Resp 16   Ht 1.626 m (5' 4\")   Wt 72.6 kg (160 lb)   SpO2 98%   BMI 27.46 kg/m²      Physical Exam  Vitals reviewed.   Constitutional:       Appearance: Normal appearance.   HENT:      Head: Normocephalic and atraumatic.   Eyes:      Extraocular Movements: Extraocular movements intact.      Conjunctiva/sclera: Conjunctivae normal.      Pupils: Pupils are equal, round, and reactive to light.   Cardiovascular:      Rate and Rhythm: Normal rate.   Pulmonary:      Effort: Pulmonary effort is normal.   Musculoskeletal:      Left lower leg: Swelling present. No edema.      Comments: Calf tenderness.   Skin:     General: Skin is warm and dry.   Neurological:      General: No focal deficit present.      Mental Status: She is alert. Mental status is at baseline.                             Assessment & Plan        1. Localized swelling of left lower leg     Unable to get patient in for ultrasound as outpatient. Patient will transfer to [facility] ER for further evaluation/management. Patient is frustrated and stated she had co-pay and we didn't do anything for her other than tell her to go to ER. Will No Charge visit. Patient to go to ER by POV.               "

## 2023-05-18 ENCOUNTER — TELEPHONE (OUTPATIENT)
Dept: SOCIAL WORK | Facility: CLINIC | Age: 73
End: 2023-05-18
Payer: MEDICARE

## 2023-05-18 NOTE — ED PROVIDER NOTES
ED Provider Note    CHIEF COMPLAINT  Chief Complaint   Patient presents with    Leg Swelling     LL leg with +recent travel. Denies CP or SOB. No wound/rash appreciated.       EXTERNAL RECORDS REVIEWED  Outpatient Notes patient was seen in urgent care earlier today for concern for DVT of the left lower extremity.    HPI/ROS  LIMITATION TO HISTORY   Select: : None  OUTSIDE HISTORIAN(S):  None    Griselda Obregon is a 73 y.o. female who presents to the emerge department chief complaint of 1 day of left lower extremity swelling.  The patient unfortunately is on hormone replacement therapy and noted 1 day of left lower calf and leg swelling.  She states that she did about a 3 to 4-hour drive yesterday but otherwise no severe long travel.  She denies any chest pain or shortness of breath no weakness numbness tingling no redness no trauma.  She had on and off left knee pain by and has been worked up with orthopedics and states is actually doing a lot better but just noticed the leg swelling today.  No real severe discomfort.    PAST MEDICAL HISTORY   has a past medical history of Bowel habit changes, Pain (11/8/16), and Psychiatric problem.    SURGICAL HISTORY   has a past surgical history that includes other (Bilateral, 2000); gyn surgery (1978; 1984); hip arthroplasty total (Right, 11/21/2016); and lumpectomy.    FAMILY HISTORY  Family History   Problem Relation Age of Onset    Hypertension Mother     Psychiatric Illness Mother         Depression    Cancer Father         Colon    Colorectal Cancer Father        SOCIAL HISTORY  Social History     Tobacco Use    Smoking status: Never    Smokeless tobacco: Never   Vaping Use    Vaping Use: Never used   Substance and Sexual Activity    Alcohol use: Yes     Alcohol/week: 0.6 oz     Types: 1 Standard drinks or equivalent per week     Comment: Social drinker only    Drug use: No    Sexual activity: Not Currently     Partners: Male     Birth control/protection:  "Post-Menopausal       CURRENT MEDICATIONS  Home Medications       Reviewed by Margot Ram R.N. (Registered Nurse) on 05/17/23 at 1608  Med List Status: Not Addressed     Medication Last Dose Status   progesterone (PROMETRIUM) 100 MG Cap  Active   spironolactone (ALDACTONE) 25 MG Tab  Active   traZODone (DESYREL) 50 MG Tab  Active   venlafaxine (EFFEXOR-XR) 150 MG extended-release capsule  Active                    ALLERGIES  Allergies   Allergen Reactions    Bee Anaphylaxis       PHYSICAL EXAM  VITAL SIGNS: /65   Pulse 72   Temp 36.8 °C (98.2 °F) (Temporal)   Resp 18   Ht 1.626 m (5' 4\")   Wt 76.4 kg (168 lb 6.9 oz)   SpO2 96%   BMI 28.91 kg/m²    Pulse OX: Pulse Oxygen level is normal  Constitutional: Alert in no apparent distress.  HENT: Normocephalic, Atraumatic, MMM  Eyes: PERound. Conjunctiva normal, non-icteric.   Heart: Regular rate and rhythm, intact distal pulses   Lungs: Symmetrical movement, no resp distress   EXT/Back nonpitting edema and swelling of the left lower extremity involving mostly the calf down to the ankle, small joint effusion on the left knee but full range of motion extension and flexion, no erythema nor no induration, bilateral DP pulses are 2+ with cap refill intact.  No swelling to the right lower extremity  Skin: Warm, Dry, No erythema, No rash.   Neurologic: Alert and oriented, Grossly non-focal.       DIAGNOSTIC STUDIES / PROCEDURES      LABS  Labs Reviewed   CBC WITH DIFFERENTIAL   BASIC METABOLIC PANEL   PROTHROMBIN TIME   APTT         RADIOLOGY  I have independently interpreted the diagnostic imaging associated with this visit and am waiting the final reading from the radiologist.   My preliminary interpretation is as follows:     US DVT LLE - no DVT    Radiologist interpretation:       US-EXTREMITY VENOUS LOWER UNILAT LEFT   Final Result         1. No evidence of left lower extremity deep venous thrombosis.   2. Likely a hematoma in the left medial calf.    " "        COURSE & MEDICAL DECISION MAKING    ED Observation Status? Yes; I am placing the patient in to an observation status due to a diagnostic uncertainty as well as therapeutic intensity. Patient placed in observation status at 5:33 PM, 5/17/2023.     Observation plan is as follows: labs, US DVT     Upon Reevaluation, the patient's condition has: Improved; and will be discharged.    Patient discharged from ED Observation status at 6:50 PM (Time) 05/17/23  (Date).     INITIAL ASSESSMENT, COURSE AND PLAN  Care Narrative: Patient presents emergency department with at least moderate risk for DVTs in the setting of travel and on hormone replacement therapy in the age of 71 as well as its nontraumatic without signs of infection.  Laboratory analysis was ordered to evaluate for her renal function and coagulable state as well as DVT of the left lower extremity.    6:27 PM  I reassessed patient the bedside we discussed her imaging findings no evidence for DVT fortunately but there is a large hematoma without clinical signs of infection.  She cannot recall any injury but states she was very active in the yard this weekend she will be given an ice pack while we are pending her blood work and likely discharge home.    DISPOSITION AND DISCUSSIONS  6:50 PM  I reassessed patient the bedside she is resting comfortably.  Laboratory analysis is unremarkable no evidence of thrombocytopenia or bleeding issue.  We discussed ice packs Ace wraps compression elevation and return for any new worsening issues.  She understands feels comfortable going home    /55   Pulse 62   Temp 36.9 °C (98.4 °F) (Temporal)   Resp 16   Ht 1.626 m (5' 4\")   Wt 76.4 kg (168 lb 6.9 oz)   SpO2 95%   BMI 28.91 kg/m²       I have discussed management of the patient with the following physicians and MALINDA's:  none    Discussion of management with other QHP or appropriate source(s): None     Escalation of care considered, and ultimately not " performed:acute inpatient care management, however at this time, the patient is most appropriate for outpatient management    Barriers to care at this time, including but not limited to:  none .     Decision tools and prescription drugs considered including, but not limited to: Pain Medications we discussed over-the-counter medications .    The patient will return for new or worsening symptoms and is stable at the time of discharge.    The patient is referred to a primary physician for blood pressure management, diabetic screening, and for all other preventative health concerns.    DISPOSITION:  Patient will be discharged home in stable condition.    FOLLOW UP:  Margot Estes M.D.  [address]  [address]  [facility] [address]  [phone]    Schedule an appointment as soon as possible for a visit         OUTPATIENT MEDICATIONS:  Discharge Medication List as of 5/17/2023  6:54 PM            FINAL DIAGNOSIS  1. Hematoma of left lower leg           Electronically signed by: Ragini Caro M.D., 5/17/2023 5:32 PM

## 2023-05-18 NOTE — TELEPHONE ENCOUNTER
ED Follow-up  Call Attempts: 1  Date of ED visit: 05/17/23  Call Outcome: Reviewed ED visit with patient  Since you've been home, how have you been feeling?: Better  Were you prescribed any medications?: No  Do you have any questions about your discharge instructions?: No  RN Recommendations: Other  Additional details: Member stated that her PCP,  Dr Garcia has left the area and she doesn't have a doctor assigned to her. Instructed to contact customer service with the phone number on the back of her card to be assigned to a doctor. Then to call to make a f/u appt. Member verbalized understanding.  Total time spent (mins): 3

## 2023-05-30 NOTE — ASSESSMENT & PLAN NOTE
Griselda reports her mood is well-controlled with venlafaxine XL 150mg daily. She has been on this medication for several years and definitely notices a difference if she runs out of medication.    30-May-2023 18:21

## 2023-07-31 NOTE — TELEPHONE ENCOUNTER
Was the patient seen in the last year in this department? Yes   Does patient have an active prescription for medications requested? Yes  Received Request Via: Patient

## 2023-08-01 RX ORDER — PROGESTERONE 100 MG/1
100 CAPSULE ORAL
Qty: 20 CAPSULE | OUTPATIENT
Start: 2023-08-01

## 2023-08-01 RX ORDER — TRAZODONE HYDROCHLORIDE 50 MG/1
50 TABLET ORAL
Qty: 90 TABLET | Refills: 2 | OUTPATIENT
Start: 2023-08-01

## 2023-08-03 ENCOUNTER — OFFICE VISIT (OUTPATIENT)
Dept: MEDICAL GROUP | Facility: MEDICAL CENTER | Age: 73
End: 2023-08-03
Payer: MEDICARE

## 2023-08-03 VITALS
SYSTOLIC BLOOD PRESSURE: 122 MMHG | OXYGEN SATURATION: 97 % | RESPIRATION RATE: 18 BRPM | HEART RATE: 77 BPM | DIASTOLIC BLOOD PRESSURE: 72 MMHG | TEMPERATURE: 97.5 F | BODY MASS INDEX: 28.89 KG/M2 | HEIGHT: 64 IN | WEIGHT: 169.2 LBS

## 2023-08-03 DIAGNOSIS — F51.01 PRIMARY INSOMNIA: ICD-10-CM

## 2023-08-03 DIAGNOSIS — E78.5 DYSLIPIDEMIA: ICD-10-CM

## 2023-08-03 DIAGNOSIS — F33.42 RECURRENT MAJOR DEPRESSIVE DISORDER, IN FULL REMISSION (HCC): ICD-10-CM

## 2023-08-03 DIAGNOSIS — E28.39 FEMALE HYPOGONADISM SYNDROME: ICD-10-CM

## 2023-08-03 PROBLEM — N28.9 LOW KIDNEY FUNCTION: Status: ACTIVE | Noted: 2022-03-27

## 2023-08-03 PROBLEM — E55.9 VITAMIN D DEFICIENCY: Status: ACTIVE | Noted: 2023-05-10

## 2023-08-03 PROCEDURE — 3074F SYST BP LT 130 MM HG: CPT | Performed by: STUDENT IN AN ORGANIZED HEALTH CARE EDUCATION/TRAINING PROGRAM

## 2023-08-03 PROCEDURE — 3078F DIAST BP <80 MM HG: CPT | Performed by: STUDENT IN AN ORGANIZED HEALTH CARE EDUCATION/TRAINING PROGRAM

## 2023-08-03 PROCEDURE — 99204 OFFICE O/P NEW MOD 45 MIN: CPT | Performed by: STUDENT IN AN ORGANIZED HEALTH CARE EDUCATION/TRAINING PROGRAM

## 2023-08-03 RX ORDER — PROGESTERONE 200 MG/1
1 CAPSULE ORAL
COMMUNITY
End: 2023-08-03

## 2023-08-03 RX ORDER — PROGESTERONE 100 MG/1
100 CAPSULE ORAL
COMMUNITY
Start: 2023-07-24

## 2023-08-03 RX ORDER — VENLAFAXINE HYDROCHLORIDE 150 MG/1
150 CAPSULE, EXTENDED RELEASE ORAL DAILY
Qty: 100 CAPSULE | Refills: 1 | Status: SHIPPED | OUTPATIENT
Start: 2023-08-03 | End: 2024-02-05

## 2023-08-03 RX ORDER — EPINEPHRINE 0.3 MG/.3ML
0.3 INJECTION SUBCUTANEOUS
Status: ON HOLD | COMMUNITY
End: 2024-03-18

## 2023-08-03 RX ORDER — TRAZODONE HYDROCHLORIDE 50 MG/1
50 TABLET ORAL NIGHTLY
Qty: 100 TABLET | Refills: 1 | Status: SHIPPED | OUTPATIENT
Start: 2023-08-03 | End: 2024-02-05

## 2023-08-03 ASSESSMENT — PATIENT HEALTH QUESTIONNAIRE - PHQ9
5. POOR APPETITE OR OVEREATING: NOT AT ALL
1. LITTLE INTEREST OR PLEASURE IN DOING THINGS: NOT AT ALL
8. MOVING OR SPEAKING SO SLOWLY THAT OTHER PEOPLE COULD HAVE NOTICED. OR THE OPPOSITE, BEING SO FIGETY OR RESTLESS THAT YOU HAVE BEEN MOVING AROUND A LOT MORE THAN USUAL: NOT AT ALL
SUM OF ALL RESPONSES TO PHQ QUESTIONS 1-9: 8
SUM OF ALL RESPONSES TO PHQ9 QUESTIONS 1 AND 2: 1
2. FEELING DOWN, DEPRESSED, IRRITABLE, OR HOPELESS: SEVERAL DAYS
6. FEELING BAD ABOUT YOURSELF - OR THAT YOU ARE A FAILURE OR HAVE LET YOURSELF OR YOUR FAMILY DOWN: NOT AL ALL
4. FEELING TIRED OR HAVING LITTLE ENERGY: NEARLY EVERY DAY
3. TROUBLE FALLING OR STAYING ASLEEP OR SLEEPING TOO MUCH: NEARLY EVERY DAY
9. THOUGHTS THAT YOU WOULD BE BETTER OFF DEAD, OR OF HURTING YOURSELF: NOT AT ALL
7. TROUBLE CONCENTRATING ON THINGS, SUCH AS READING THE NEWSPAPER OR WATCHING TELEVISION: SEVERAL DAYS

## 2023-08-03 ASSESSMENT — FIBROSIS 4 INDEX: FIB4 SCORE: 1.46

## 2023-08-03 NOTE — PROGRESS NOTES
"Subjective:     CC: Establish care, refill of Effexor     HPI:   Griselda presents today with      Problem   Vitamin D Deficiency   Female Hypogonadism Syndrome     Following up with urology  She gets prescription of progesterone 100 mg daily and spironolactone 25 mg daily   from urologist.  Tolerating well       Recurrent Major Depressive Disorder, in Full Remission (Hcc)    This is a Chronic medical problem   PHQ screen:  Associated symptoms:  Suicidal ideation/homicidal ideation: none  Therapy/counseling:  Medications: Venlafaxine 150 gm daily   Improving/worsening: stable       Primary Insomnia    Chronic medical problem   Well controlled with trazodone 50 mg QHS  Tolerating well. No side effects           ROS:  ROS    Review of systems unremarkable except for concerns noted by patient or items listed.    Please see HPI for additional ROS.      Objective:     Exam:  /72 (BP Location: Left arm, Patient Position: Sitting, BP Cuff Size: Adult long)   Pulse 77   Temp 36.4 °C (97.5 °F) (Temporal)   Resp 18   Ht 1.626 m (5' 4\")   Wt 76.7 kg (169 lb 3.2 oz)   SpO2 97%   BMI 29.04 kg/m²  Body mass index is 29.04 kg/m².    Physical Exam  Constitutional:       Appearance: Normal appearance.   HENT:      Head: Normocephalic.      Mouth/Throat:      Mouth: Mucous membranes are moist.      Pharynx: Oropharynx is clear.   Eyes:      General: No scleral icterus.     Conjunctiva/sclera: Conjunctivae normal.   Cardiovascular:      Rate and Rhythm: Normal rate and regular rhythm.      Pulses: Normal pulses.      Heart sounds: Normal heart sounds.   Pulmonary:      Effort: Pulmonary effort is normal.      Breath sounds: Normal breath sounds.   Abdominal:      General: Bowel sounds are normal.      Palpations: Abdomen is soft.   Musculoskeletal:      Cervical back: Neck supple.      Right lower leg: No edema.      Left lower leg: No edema.   Skin:     General: Skin is warm.      Capillary Refill: Capillary refill takes " less than 2 seconds.   Neurological:      General: No focal deficit present.      Mental Status: She is alert and oriented to person, place, and time.   Psychiatric:         Mood and Affect: Mood normal.         Behavior: Behavior normal.       HCC Gap Form    Diagnosis to address: F33.42 - Recurrent major depressive disorder, in full remission (HCC)  Assessment and plan: Chronic, stable. Continue with current defined treatment plan: Continue Effexor 150 mg daily. Follow-up at least annually.  Last edited 08/03/23 16:23 PDT by Marlys Soliman M.D.             Labs: reviewed     Assessment & Plan:     73 y.o. female with the following -     1. Recurrent major depressive disorder, in full remission (HCC)    Chronic problem, stable on current medication  Continue venlafaxine 150 mg daily once  - venlafaxine (EFFEXOR-XR) 150 MG extended-release capsule; Take 1 Capsule by mouth every day.  Dispense: 100 Capsule; Refill: 1    2. Primary insomnia  Chronic condition, stable  Continue trazodone 50 mg daily    - traZODone (DESYREL) 50 MG Tab; Take 1 Tablet by mouth every evening.  Dispense: 100 Tablet; Refill: 1    3. Female hypogonadism syndrome  Deferred to urologist  Continue spironolactone and progesterone as prescribed by specialist    4. Dyslipidemia  Chronic, stable  This is diet and exercise managed  Continue healthy low-fat diet and regular exercise.       I spent a total of 20 minutes with record review, exam, communication with the patient, communication with other providers, and documentation of this encounter.      Return in about 6 months (around 2/3/2024).    Please note that this dictation was created using voice recognition software. I have made every reasonable attempt to correct obvious errors, but I expect that there are errors of grammar and possibly content that I did not discover before finalizing the note.

## 2023-12-13 ENCOUNTER — OFFICE VISIT (OUTPATIENT)
Dept: URGENT CARE | Facility: CLINIC | Age: 73
End: 2023-12-13
Payer: MEDICARE

## 2023-12-13 VITALS
SYSTOLIC BLOOD PRESSURE: 108 MMHG | DIASTOLIC BLOOD PRESSURE: 64 MMHG | HEART RATE: 74 BPM | HEIGHT: 64 IN | BODY MASS INDEX: 28.17 KG/M2 | OXYGEN SATURATION: 96 % | TEMPERATURE: 98 F | RESPIRATION RATE: 16 BRPM | WEIGHT: 165 LBS

## 2023-12-13 DIAGNOSIS — R21 RASH AND NONSPECIFIC SKIN ERUPTION: ICD-10-CM

## 2023-12-13 PROCEDURE — 3078F DIAST BP <80 MM HG: CPT | Performed by: PHYSICIAN ASSISTANT

## 2023-12-13 PROCEDURE — 99214 OFFICE O/P EST MOD 30 MIN: CPT | Performed by: PHYSICIAN ASSISTANT

## 2023-12-13 PROCEDURE — 3074F SYST BP LT 130 MM HG: CPT | Performed by: PHYSICIAN ASSISTANT

## 2023-12-13 RX ORDER — CEPHALEXIN 500 MG/1
500 CAPSULE ORAL 4 TIMES DAILY
Qty: 28 CAPSULE | Refills: 0 | Status: SHIPPED | OUTPATIENT
Start: 2023-12-13 | End: 2023-12-20

## 2023-12-13 ASSESSMENT — ENCOUNTER SYMPTOMS
RHINORRHEA: 0
COUGH: 0
FATIGUE: 0
SORE THROAT: 0
FEVER: 0

## 2023-12-13 NOTE — PROGRESS NOTES
Subjective:   Griselda Obregon is a 73 y.o. female who presents for Rash (X 2 weeks, rash on chin that is not improving )        Rash  This is a new problem. Episode onset: 2 weeks. The problem has been gradually worsening since onset. The affected locations include the face. The rash is characterized by itchiness and redness. She was exposed to nothing. Pertinent negatives include no congestion, cough, fatigue, fever, rhinorrhea or sore throat. Treatments tried: Valtrex, Abreva. The treatment provided no relief. There is no history of eczema.     Review of Systems   Constitutional:  Negative for fatigue and fever.   HENT:  Negative for congestion, rhinorrhea and sore throat.    Respiratory:  Negative for cough.    Skin:  Positive for rash.       PMH:  has a past medical history of Bowel habit changes, Pain (11/8/16), and Psychiatric problem.  MEDS:   Current Outpatient Medications:     mupirocin (BACTROBAN) 2 % Ointment, Apply 1 Application topically 2 times a day., Disp: 22 g, Rfl: 0    cephALEXin (KEFLEX) 500 MG Cap, Take 1 Capsule by mouth 4 times a day for 7 days., Disp: 28 Capsule, Rfl: 0    EPINEPHrine (EPIPEN) 0.3 MG/0.3ML Solution Auto-injector solution for injection, Inject 0.3 mg under the skin one time as needed., Disp: , Rfl:     progesterone (PROMETRIUM) 100 MG Cap, Take 100 mg by mouth every day., Disp: , Rfl:     venlafaxine (EFFEXOR-XR) 150 MG extended-release capsule, Take 1 Capsule by mouth every day., Disp: 100 Capsule, Rfl: 1    traZODone (DESYREL) 50 MG Tab, Take 1 Tablet by mouth every evening., Disp: 100 Tablet, Rfl: 1    spironolactone (ALDACTONE) 25 MG Tab, spironolactone 25 mg tablet (Patient not taking: Reported on 12/13/2023), Disp: , Rfl:   ALLERGIES:   Allergies   Allergen Reactions    Bee Anaphylaxis     SURGHX:   Past Surgical History:   Procedure Laterality Date    HIP ARTHROPLASTY TOTAL Right 11/21/2016    Procedure: HIP ARTHROPLASTY TOTAL;  Surgeon: Les Dyson M.D.;   "Location: SURGERY [facility];  Service:     OTHER Bilateral 2000    foot surgery    GYN SURGERY  ;         LUMPECTOMY       SOCHX:  reports that she has never smoked. She has never used smokeless tobacco. She reports current alcohol use of about 0.6 oz of alcohol per week. She reports that she does not use drugs.  FH: Family history was reviewed, no pertinent findings to report   Objective:   /64   Pulse 74   Temp 36.7 °C (98 °F) (Temporal)   Resp 16   Ht 1.626 m (5' 4\")   Wt 74.8 kg (165 lb)   SpO2 96%   BMI 28.32 kg/m²   Physical Exam  Vitals reviewed.   Constitutional:       General: She is not in acute distress.     Appearance: Normal appearance. She is well-developed. She is not toxic-appearing.   HENT:      Head: Normocephalic and atraumatic.        Comments: Mildly erythematous maculopapular lesions on chin, angle of mouth and adjacent to external nares.  No discharge.  No pustules.  No vesicles.     Right Ear: External ear normal.      Left Ear: External ear normal.      Nose: Nose normal.      Mouth/Throat:     Cardiovascular:      Rate and Rhythm: Normal rate and regular rhythm.   Pulmonary:      Effort: Pulmonary effort is normal. No respiratory distress.      Breath sounds: No stridor.   Skin:     General: Skin is dry.   Neurological:      Comments: Alert and oriented.    Psychiatric:         Speech: Speech normal.         Behavior: Behavior normal.           Assessment/Plan:   1. Rash and nonspecific skin eruption  - mupirocin (BACTROBAN) 2 % Ointment; Apply 1 Application topically 2 times a day.  Dispense: 22 g; Refill: 0  - cephALEXin (KEFLEX) 500 MG Cap; Take 1 Capsule by mouth 4 times a day for 7 days.  Dispense: 28 Capsule; Refill: 0        Considerations include but not limited to impetigo, rosacea, folliculitis, dermatitis.  Lesions do not look herpetic.  Low clinical suspicion for fungal etiology, but this was considered.    Exam today most consistent with " impetigo.  Patient started on Keflex and topical mupirocin.  If no improvement within 3 to 4 days, new symptoms develop at any point, symptoms worsen see PCP or return to clinic for reevaluation.

## 2024-01-04 DIAGNOSIS — R21 RASH AND NONSPECIFIC SKIN ERUPTION: ICD-10-CM

## 2024-02-05 DIAGNOSIS — F51.01 PRIMARY INSOMNIA: ICD-10-CM

## 2024-02-05 DIAGNOSIS — F33.42 RECURRENT MAJOR DEPRESSIVE DISORDER, IN FULL REMISSION (HCC): ICD-10-CM

## 2024-02-05 RX ORDER — TRAZODONE HYDROCHLORIDE 50 MG/1
50 TABLET ORAL NIGHTLY
Qty: 100 TABLET | Refills: 1 | Status: SHIPPED | OUTPATIENT
Start: 2024-02-05

## 2024-02-05 RX ORDER — VENLAFAXINE HYDROCHLORIDE 150 MG/1
150 CAPSULE, EXTENDED RELEASE ORAL DAILY
Qty: 100 CAPSULE | Refills: 1 | Status: SHIPPED | OUTPATIENT
Start: 2024-02-05

## 2024-02-05 NOTE — TELEPHONE ENCOUNTER
Received request via: Pharmacy    Was the patient seen in the last year in this department? Yes    Does the patient have an active prescription (recently filled or refills available) for medication(s) requested? No    [facility]    Does the patient have [facility] and need 100 day supply (blood pressure, diabetes and cholesterol meds only)? Yes, quantity updated to 100 days

## 2024-02-09 ENCOUNTER — HOSPITAL ENCOUNTER (OUTPATIENT)
Dept: LAB | Facility: MEDICAL CENTER | Age: 74
End: 2024-02-09
Attending: STUDENT IN AN ORGANIZED HEALTH CARE EDUCATION/TRAINING PROGRAM
Payer: MEDICARE

## 2024-02-09 LAB
BUN SERPL-MCNC: 14 MG/DL (ref 8–22)
CREAT SERPL-MCNC: 0.85 MG/DL (ref 0.5–1.4)
GFR SERPLBLD CREATININE-BSD FMLA CKD-EPI: 72 ML/MIN/1.73 M 2

## 2024-02-09 PROCEDURE — 82565 ASSAY OF CREATININE: CPT

## 2024-02-09 PROCEDURE — 84520 ASSAY OF UREA NITROGEN: CPT

## 2024-02-09 PROCEDURE — 36415 COLL VENOUS BLD VENIPUNCTURE: CPT

## 2024-02-12 ENCOUNTER — HOSPITAL ENCOUNTER (OUTPATIENT)
Dept: RADIOLOGY | Facility: MEDICAL CENTER | Age: 74
End: 2024-02-12
Attending: STUDENT IN AN ORGANIZED HEALTH CARE EDUCATION/TRAINING PROGRAM
Payer: MEDICARE

## 2024-02-12 DIAGNOSIS — N28.1 CYST OF KIDNEY, ACQUIRED: ICD-10-CM

## 2024-02-12 PROCEDURE — 74178 CT ABD&PLV WO CNTR FLWD CNTR: CPT

## 2024-02-12 PROCEDURE — 700117 HCHG RX CONTRAST REV CODE 255: Performed by: STUDENT IN AN ORGANIZED HEALTH CARE EDUCATION/TRAINING PROGRAM

## 2024-02-12 RX ADMIN — IOHEXOL 100 ML: 350 INJECTION, SOLUTION INTRAVENOUS at 15:41

## 2024-02-27 ENCOUNTER — HOSPITAL ENCOUNTER (OUTPATIENT)
Dept: RADIOLOGY | Facility: MEDICAL CENTER | Age: 74
End: 2024-02-27
Attending: ORTHOPAEDIC SURGERY
Payer: MEDICARE

## 2024-02-27 DIAGNOSIS — M25.561 RIGHT KNEE PAIN, UNSPECIFIED CHRONICITY: ICD-10-CM

## 2024-02-27 PROCEDURE — 73721 MRI JNT OF LWR EXTRE W/O DYE: CPT | Mod: RT

## 2024-03-06 ENCOUNTER — APPOINTMENT (OUTPATIENT)
Dept: ADMISSIONS | Facility: MEDICAL CENTER | Age: 74
End: 2024-03-06
Attending: ORTHOPAEDIC SURGERY
Payer: MEDICARE

## 2024-03-12 ENCOUNTER — PRE-ADMISSION TESTING (OUTPATIENT)
Dept: ADMISSIONS | Facility: MEDICAL CENTER | Age: 74
End: 2024-03-12
Attending: ORTHOPAEDIC SURGERY
Payer: MEDICARE

## 2024-03-12 VITALS — HEIGHT: 64 IN | BODY MASS INDEX: 28.32 KG/M2

## 2024-03-12 RX ORDER — MELATONIN 10 MG/ML
DROPS ORAL
Status: ON HOLD | COMMUNITY
End: 2024-03-18

## 2024-03-12 NOTE — PREPROCEDURE INSTRUCTIONS
"Preadmit appointment: \" Preparing for your Procedure information\" sheet given to patient with verbal and written instructions. Patient instructed to continue prescribed medications through the day before surgery, instructed to take the following medications the day of surgery per anesthesia protocol: none.      Pt states, \"no issues with anesthesia\".  Fasting guidelines per MD's instructions for type of diet and when to initiate NPO status.      All Pt's questions and concerns answered or addressed.  "

## 2024-03-14 ENCOUNTER — APPOINTMENT (OUTPATIENT)
Dept: ADMISSIONS | Facility: MEDICAL CENTER | Age: 74
End: 2024-03-14
Attending: ORTHOPAEDIC SURGERY
Payer: MEDICARE

## 2024-03-14 DIAGNOSIS — Z01.810 PRE-OPERATIVE CARDIOVASCULAR EXAMINATION: ICD-10-CM

## 2024-03-14 DIAGNOSIS — Z01.812 PRE-OPERATIVE LABORATORY EXAMINATION: ICD-10-CM

## 2024-03-14 LAB
ANION GAP SERPL CALC-SCNC: 13 MMOL/L (ref 7–16)
BUN SERPL-MCNC: 19 MG/DL (ref 8–22)
CALCIUM SERPL-MCNC: 9 MG/DL (ref 8.4–10.2)
CHLORIDE SERPL-SCNC: 105 MMOL/L (ref 96–112)
CO2 SERPL-SCNC: 22 MMOL/L (ref 20–33)
CREAT SERPL-MCNC: 0.81 MG/DL (ref 0.5–1.4)
EKG IMPRESSION: NORMAL
ERYTHROCYTE [DISTWIDTH] IN BLOOD BY AUTOMATED COUNT: 45.2 FL (ref 35.9–50)
GFR SERPLBLD CREATININE-BSD FMLA CKD-EPI: 77 ML/MIN/1.73 M 2
GLUCOSE SERPL-MCNC: 77 MG/DL (ref 65–99)
HCT VFR BLD AUTO: 44.1 % (ref 37–47)
HGB BLD-MCNC: 15.1 G/DL (ref 12–16)
MCH RBC QN AUTO: 33.4 PG (ref 27–33)
MCHC RBC AUTO-ENTMCNC: 34.2 G/DL (ref 32.2–35.5)
MCV RBC AUTO: 97.6 FL (ref 81.4–97.8)
PLATELET # BLD AUTO: 255 K/UL (ref 164–446)
PMV BLD AUTO: 11.2 FL (ref 9–12.9)
POTASSIUM SERPL-SCNC: 4 MMOL/L (ref 3.6–5.5)
RBC # BLD AUTO: 4.52 M/UL (ref 4.2–5.4)
SCCMEC + MECA PNL NOSE NAA+PROBE: NEGATIVE
SCCMEC + MECA PNL NOSE NAA+PROBE: NEGATIVE
SODIUM SERPL-SCNC: 140 MMOL/L (ref 135–145)
WBC # BLD AUTO: 6.6 K/UL (ref 4.8–10.8)

## 2024-03-14 PROCEDURE — 36415 COLL VENOUS BLD VENIPUNCTURE: CPT

## 2024-03-14 PROCEDURE — 80048 BASIC METABOLIC PNL TOTAL CA: CPT

## 2024-03-14 PROCEDURE — 85027 COMPLETE CBC AUTOMATED: CPT

## 2024-03-14 PROCEDURE — 93010 ELECTROCARDIOGRAM REPORT: CPT | Performed by: INTERNAL MEDICINE

## 2024-03-14 PROCEDURE — 93005 ELECTROCARDIOGRAM TRACING: CPT

## 2024-03-14 PROCEDURE — 87640 STAPH A DNA AMP PROBE: CPT

## 2024-03-14 PROCEDURE — 87641 MR-STAPH DNA AMP PROBE: CPT

## 2024-03-14 NOTE — DISCHARGE PLANNING
DISCHARGE PLANNING NOTE - TOTAL JOINT    Procedure: Procedure(s):  RIGHT KNEE UNICOMPARTMENTAL ARTHROPLASTY, POSSIBLE LOOSE BODY EXCISION AND REPAIR AS INDICATED  REMOVAL, LOOSE BODY, JOINT  Procedure Date: 3/18/2024  Insurance: Payor: MEDICARE / Plan: MEDICARE PART A & B / Product Type: *No Product type* /    Equipment currently available at home?  cane, crutches, front-wheel walker, and will get ice machine.  Steps into the home? 1  Steps within the home? 1 flight  Toilet height? ADA  Type of shower? tub-shower  Home Oxygen? No  Portable tank?    Oxygen Provider:  Who will be with you during your recovery? son  Is Outpatient Physical Therapy set up after surgery? Yes in process.  Did you take the Total Joint Class and where? Yes, received NAON book.  Planning same day discharge?Yes     This writer met with pt and educated to preop showers, nasal mrsa swab which was obtained by this writer, and potential for overnight stay. CHG kit given to pt. Home safety checklist and equipment resource guide sent home with pt. Pt educated to dc criteria. All questions answered and verbalizes understanding of all instructions. No dc needs identified at this time. Anticipate dc to home without barriers.

## 2024-03-17 ENCOUNTER — ANESTHESIA EVENT (OUTPATIENT)
Dept: SURGERY | Facility: MEDICAL CENTER | Age: 74
End: 2024-03-17
Payer: MEDICARE

## 2024-03-18 ENCOUNTER — APPOINTMENT (OUTPATIENT)
Dept: RADIOLOGY | Facility: MEDICAL CENTER | Age: 74
End: 2024-03-18
Payer: MEDICARE

## 2024-03-18 ENCOUNTER — ANESTHESIA (OUTPATIENT)
Dept: SURGERY | Facility: MEDICAL CENTER | Age: 74
End: 2024-03-18
Payer: MEDICARE

## 2024-03-18 ENCOUNTER — HOSPITAL ENCOUNTER (OUTPATIENT)
Facility: MEDICAL CENTER | Age: 74
End: 2024-03-18
Attending: ORTHOPAEDIC SURGERY | Admitting: ORTHOPAEDIC SURGERY
Payer: MEDICARE

## 2024-03-18 VITALS
DIASTOLIC BLOOD PRESSURE: 62 MMHG | HEIGHT: 64 IN | OXYGEN SATURATION: 93 % | TEMPERATURE: 98.2 F | WEIGHT: 161.71 LBS | BODY MASS INDEX: 27.61 KG/M2 | HEART RATE: 93 BPM | RESPIRATION RATE: 16 BRPM | SYSTOLIC BLOOD PRESSURE: 123 MMHG

## 2024-03-18 PROCEDURE — 160029 HCHG SURGERY MINUTES - 1ST 30 MINS LEVEL 4: Performed by: ORTHOPAEDIC SURGERY

## 2024-03-18 PROCEDURE — 700111 HCHG RX REV CODE 636 W/ 250 OVERRIDE (IP): Mod: JZ | Performed by: ORTHOPAEDIC SURGERY

## 2024-03-18 PROCEDURE — 160048 HCHG OR STATISTICAL LEVEL 1-5: Performed by: ORTHOPAEDIC SURGERY

## 2024-03-18 PROCEDURE — 97110 THERAPEUTIC EXERCISES: CPT

## 2024-03-18 PROCEDURE — 700101 HCHG RX REV CODE 250: Performed by: ORTHOPAEDIC SURGERY

## 2024-03-18 PROCEDURE — C1776 JOINT DEVICE (IMPLANTABLE): HCPCS | Performed by: ORTHOPAEDIC SURGERY

## 2024-03-18 PROCEDURE — 97161 PT EVAL LOW COMPLEX 20 MIN: CPT

## 2024-03-18 PROCEDURE — 160046 HCHG PACU - 1ST 60 MINS PHASE II: Performed by: ORTHOPAEDIC SURGERY

## 2024-03-18 PROCEDURE — A9270 NON-COVERED ITEM OR SERVICE: HCPCS | Performed by: ANESTHESIOLOGY

## 2024-03-18 PROCEDURE — 700111 HCHG RX REV CODE 636 W/ 250 OVERRIDE (IP): Performed by: ORTHOPAEDIC SURGERY

## 2024-03-18 PROCEDURE — 502000 HCHG MISC OR IMPLANTS RC 0278: Performed by: ORTHOPAEDIC SURGERY

## 2024-03-18 PROCEDURE — 700102 HCHG RX REV CODE 250 W/ 637 OVERRIDE(OP): Performed by: ANESTHESIOLOGY

## 2024-03-18 PROCEDURE — 73560 X-RAY EXAM OF KNEE 1 OR 2: CPT | Mod: RT

## 2024-03-18 PROCEDURE — 160041 HCHG SURGERY MINUTES - EA ADDL 1 MIN LEVEL 4: Performed by: ORTHOPAEDIC SURGERY

## 2024-03-18 PROCEDURE — 700111 HCHG RX REV CODE 636 W/ 250 OVERRIDE (IP): Performed by: ANESTHESIOLOGY

## 2024-03-18 PROCEDURE — 700105 HCHG RX REV CODE 258: Performed by: ORTHOPAEDIC SURGERY

## 2024-03-18 PROCEDURE — 160009 HCHG ANES TIME/MIN: Performed by: ORTHOPAEDIC SURGERY

## 2024-03-18 PROCEDURE — 700101 HCHG RX REV CODE 250: Performed by: ANESTHESIOLOGY

## 2024-03-18 PROCEDURE — 97116 GAIT TRAINING THERAPY: CPT

## 2024-03-18 PROCEDURE — 160036 HCHG PACU - EA ADDL 30 MINS PHASE I: Performed by: ORTHOPAEDIC SURGERY

## 2024-03-18 PROCEDURE — C9290 INJ, BUPIVACAINE LIPOSOME: HCPCS | Performed by: ORTHOPAEDIC SURGERY

## 2024-03-18 PROCEDURE — 160002 HCHG RECOVERY MINUTES (STAT): Performed by: ORTHOPAEDIC SURGERY

## 2024-03-18 PROCEDURE — 160035 HCHG PACU - 1ST 60 MINS PHASE I: Performed by: ORTHOPAEDIC SURGERY

## 2024-03-18 PROCEDURE — 160025 RECOVERY II MINUTES (STATS): Performed by: ORTHOPAEDIC SURGERY

## 2024-03-18 DEVICE — IMPLANTABLE DEVICE: Type: IMPLANTABLE DEVICE | Site: KNEE | Status: FUNCTIONAL

## 2024-03-18 RX ORDER — PHENYLEPHRINE HCL IN 0.9% NACL 0.5 MG/5ML
SYRINGE (ML) INTRAVENOUS PRN
Status: DISCONTINUED | OUTPATIENT
Start: 2024-03-18 | End: 2024-03-18 | Stop reason: SURG

## 2024-03-18 RX ORDER — SODIUM CHLORIDE, SODIUM LACTATE, POTASSIUM CHLORIDE, CALCIUM CHLORIDE 600; 310; 30; 20 MG/100ML; MG/100ML; MG/100ML; MG/100ML
INJECTION, SOLUTION INTRAVENOUS CONTINUOUS
Status: DISCONTINUED | OUTPATIENT
Start: 2024-03-18 | End: 2024-03-18 | Stop reason: HOSPADM

## 2024-03-18 RX ORDER — HYDROMORPHONE HYDROCHLORIDE 1 MG/ML
0.2 INJECTION, SOLUTION INTRAMUSCULAR; INTRAVENOUS; SUBCUTANEOUS
Status: DISCONTINUED | OUTPATIENT
Start: 2024-03-18 | End: 2024-03-18 | Stop reason: HOSPADM

## 2024-03-18 RX ORDER — OXYCODONE HYDROCHLORIDE 5 MG/1
5 TABLET ORAL
Status: CANCELLED | OUTPATIENT
Start: 2024-03-18

## 2024-03-18 RX ORDER — OXYCODONE HYDROCHLORIDE 10 MG/1
10 TABLET ORAL
Status: CANCELLED | OUTPATIENT
Start: 2024-03-18

## 2024-03-18 RX ORDER — SCOLOPAMINE TRANSDERMAL SYSTEM 1 MG/1
1 PATCH, EXTENDED RELEASE TRANSDERMAL
Status: CANCELLED | OUTPATIENT
Start: 2024-03-18

## 2024-03-18 RX ORDER — ASPIRIN 325 MG
325 TABLET ORAL 2 TIMES DAILY
Status: CANCELLED | OUTPATIENT
Start: 2024-03-18

## 2024-03-18 RX ORDER — ROPIVACAINE HYDROCHLORIDE 5 MG/ML
INJECTION, SOLUTION EPIDURAL; INFILTRATION; PERINEURAL
Status: DISCONTINUED | OUTPATIENT
Start: 2024-03-18 | End: 2024-03-18 | Stop reason: HOSPADM

## 2024-03-18 RX ORDER — DIPHENHYDRAMINE HYDROCHLORIDE 50 MG/ML
12.5 INJECTION INTRAMUSCULAR; INTRAVENOUS
Status: DISCONTINUED | OUTPATIENT
Start: 2024-03-18 | End: 2024-03-18 | Stop reason: HOSPADM

## 2024-03-18 RX ORDER — ACETAMINOPHEN 500 MG
1000 TABLET ORAL ONCE
Status: COMPLETED | OUTPATIENT
Start: 2024-03-18 | End: 2024-03-18

## 2024-03-18 RX ORDER — CELECOXIB 200 MG/1
200 CAPSULE ORAL 2 TIMES DAILY PRN
Status: CANCELLED | OUTPATIENT
Start: 2024-03-23

## 2024-03-18 RX ORDER — EPHEDRINE SULFATE 50 MG/ML
INJECTION, SOLUTION INTRAVENOUS PRN
Status: DISCONTINUED | OUTPATIENT
Start: 2024-03-18 | End: 2024-03-18 | Stop reason: SURG

## 2024-03-18 RX ORDER — ACETAMINOPHEN 500 MG
1000 TABLET ORAL EVERY 6 HOURS
Status: CANCELLED | OUTPATIENT
Start: 2024-03-18 | End: 2024-03-23

## 2024-03-18 RX ORDER — EPHEDRINE SULFATE 50 MG/ML
5 INJECTION, SOLUTION INTRAVENOUS
Status: DISCONTINUED | OUTPATIENT
Start: 2024-03-18 | End: 2024-03-18 | Stop reason: HOSPADM

## 2024-03-18 RX ORDER — DIPHENHYDRAMINE HYDROCHLORIDE 50 MG/ML
25 INJECTION INTRAMUSCULAR; INTRAVENOUS EVERY 6 HOURS PRN
Status: CANCELLED | OUTPATIENT
Start: 2024-03-18

## 2024-03-18 RX ORDER — LABETALOL HYDROCHLORIDE 5 MG/ML
5 INJECTION, SOLUTION INTRAVENOUS
Status: DISCONTINUED | OUTPATIENT
Start: 2024-03-18 | End: 2024-03-18 | Stop reason: HOSPADM

## 2024-03-18 RX ORDER — HYDRALAZINE HYDROCHLORIDE 20 MG/ML
5 INJECTION INTRAMUSCULAR; INTRAVENOUS
Status: DISCONTINUED | OUTPATIENT
Start: 2024-03-18 | End: 2024-03-18 | Stop reason: HOSPADM

## 2024-03-18 RX ORDER — DEXAMETHASONE SODIUM PHOSPHATE 4 MG/ML
4 INJECTION, SOLUTION INTRA-ARTICULAR; INTRALESIONAL; INTRAMUSCULAR; INTRAVENOUS; SOFT TISSUE
Status: CANCELLED | OUTPATIENT
Start: 2024-03-18

## 2024-03-18 RX ORDER — ONDANSETRON 2 MG/ML
INJECTION INTRAMUSCULAR; INTRAVENOUS PRN
Status: DISCONTINUED | OUTPATIENT
Start: 2024-03-18 | End: 2024-03-18 | Stop reason: SURG

## 2024-03-18 RX ORDER — DOCUSATE SODIUM 100 MG/1
100 CAPSULE, LIQUID FILLED ORAL 2 TIMES DAILY
Status: CANCELLED | OUTPATIENT
Start: 2024-03-18

## 2024-03-18 RX ORDER — HALOPERIDOL 5 MG/ML
1 INJECTION INTRAMUSCULAR
Status: DISCONTINUED | OUTPATIENT
Start: 2024-03-18 | End: 2024-03-18 | Stop reason: HOSPADM

## 2024-03-18 RX ORDER — DEXAMETHASONE SODIUM PHOSPHATE 4 MG/ML
INJECTION, SOLUTION INTRA-ARTICULAR; INTRALESIONAL; INTRAMUSCULAR; INTRAVENOUS; SOFT TISSUE PRN
Status: DISCONTINUED | OUTPATIENT
Start: 2024-03-18 | End: 2024-03-18 | Stop reason: SURG

## 2024-03-18 RX ORDER — HYDROMORPHONE HYDROCHLORIDE 1 MG/ML
0.1 INJECTION, SOLUTION INTRAMUSCULAR; INTRAVENOUS; SUBCUTANEOUS
Status: DISCONTINUED | OUTPATIENT
Start: 2024-03-18 | End: 2024-03-18 | Stop reason: HOSPADM

## 2024-03-18 RX ORDER — OXYCODONE HCL 5 MG/5 ML
5 SOLUTION, ORAL ORAL
Status: DISCONTINUED | OUTPATIENT
Start: 2024-03-18 | End: 2024-03-18 | Stop reason: HOSPADM

## 2024-03-18 RX ORDER — ONDANSETRON 2 MG/ML
4 INJECTION INTRAMUSCULAR; INTRAVENOUS
Status: DISCONTINUED | OUTPATIENT
Start: 2024-03-18 | End: 2024-03-18 | Stop reason: HOSPADM

## 2024-03-18 RX ORDER — AMOXICILLIN 250 MG
1 CAPSULE ORAL NIGHTLY
Status: CANCELLED | OUTPATIENT
Start: 2024-03-18

## 2024-03-18 RX ORDER — CELECOXIB 200 MG/1
200 CAPSULE ORAL 2 TIMES DAILY
Status: CANCELLED | OUTPATIENT
Start: 2024-03-18 | End: 2024-03-23

## 2024-03-18 RX ORDER — TRANEXAMIC ACID 100 MG/ML
INJECTION, SOLUTION INTRAVENOUS PRN
Status: DISCONTINUED | OUTPATIENT
Start: 2024-03-18 | End: 2024-03-18 | Stop reason: SURG

## 2024-03-18 RX ORDER — KETOROLAC TROMETHAMINE 30 MG/ML
INJECTION, SOLUTION INTRAMUSCULAR; INTRAVENOUS
Status: DISCONTINUED | OUTPATIENT
Start: 2024-03-18 | End: 2024-03-18 | Stop reason: HOSPADM

## 2024-03-18 RX ORDER — OXYCODONE HCL 5 MG/5 ML
10 SOLUTION, ORAL ORAL
Status: DISCONTINUED | OUTPATIENT
Start: 2024-03-18 | End: 2024-03-18 | Stop reason: HOSPADM

## 2024-03-18 RX ORDER — SODIUM CHLORIDE, SODIUM LACTATE, POTASSIUM CHLORIDE, CALCIUM CHLORIDE 600; 310; 30; 20 MG/100ML; MG/100ML; MG/100ML; MG/100ML
INJECTION, SOLUTION INTRAVENOUS CONTINUOUS
Status: ACTIVE | OUTPATIENT
Start: 2024-03-18 | End: 2024-03-18

## 2024-03-18 RX ORDER — HYDROMORPHONE HYDROCHLORIDE 1 MG/ML
0.4 INJECTION, SOLUTION INTRAMUSCULAR; INTRAVENOUS; SUBCUTANEOUS
Status: DISCONTINUED | OUTPATIENT
Start: 2024-03-18 | End: 2024-03-18 | Stop reason: HOSPADM

## 2024-03-18 RX ORDER — HYDROMORPHONE HYDROCHLORIDE 2 MG/ML
INJECTION, SOLUTION INTRAMUSCULAR; INTRAVENOUS; SUBCUTANEOUS PRN
Status: DISCONTINUED | OUTPATIENT
Start: 2024-03-18 | End: 2024-03-18 | Stop reason: SURG

## 2024-03-18 RX ORDER — BISACODYL 10 MG
10 SUPPOSITORY, RECTAL RECTAL
Status: CANCELLED | OUTPATIENT
Start: 2024-03-18

## 2024-03-18 RX ORDER — POLYETHYLENE GLYCOL 3350 17 G/17G
1 POWDER, FOR SOLUTION ORAL 2 TIMES DAILY PRN
Status: CANCELLED | OUTPATIENT
Start: 2024-03-18

## 2024-03-18 RX ORDER — AMOXICILLIN 250 MG
1 CAPSULE ORAL
Status: CANCELLED | OUTPATIENT
Start: 2024-03-18

## 2024-03-18 RX ORDER — CEFAZOLIN SODIUM 1 G/3ML
2 INJECTION, POWDER, FOR SOLUTION INTRAMUSCULAR; INTRAVENOUS ONCE
Status: COMPLETED | OUTPATIENT
Start: 2024-03-18 | End: 2024-03-18

## 2024-03-18 RX ORDER — CELECOXIB 200 MG/1
200 CAPSULE ORAL ONCE
Status: COMPLETED | OUTPATIENT
Start: 2024-03-18 | End: 2024-03-18

## 2024-03-18 RX ORDER — ACETAMINOPHEN 500 MG
1000 TABLET ORAL EVERY 6 HOURS PRN
Status: CANCELLED | OUTPATIENT
Start: 2024-03-23

## 2024-03-18 RX ORDER — SODIUM CHLORIDE 9 MG/ML
INJECTION, SOLUTION INTRAMUSCULAR; INTRAVENOUS; SUBCUTANEOUS
Status: DISCONTINUED | OUTPATIENT
Start: 2024-03-18 | End: 2024-03-18 | Stop reason: HOSPADM

## 2024-03-18 RX ORDER — ONDANSETRON 2 MG/ML
4 INJECTION INTRAMUSCULAR; INTRAVENOUS EVERY 4 HOURS PRN
Status: CANCELLED | OUTPATIENT
Start: 2024-03-18

## 2024-03-18 RX ORDER — ENEMA 19; 7 G/133ML; G/133ML
1 ENEMA RECTAL
Status: CANCELLED | OUTPATIENT
Start: 2024-03-18

## 2024-03-18 RX ORDER — LIDOCAINE HYDROCHLORIDE 20 MG/ML
INJECTION, SOLUTION EPIDURAL; INFILTRATION; INTRACAUDAL; PERINEURAL PRN
Status: DISCONTINUED | OUTPATIENT
Start: 2024-03-18 | End: 2024-03-18 | Stop reason: SURG

## 2024-03-18 RX ADMIN — CELECOXIB 200 MG: 200 CAPSULE ORAL at 08:00

## 2024-03-18 RX ADMIN — FENTANYL CITRATE 50 MCG: 50 INJECTION, SOLUTION INTRAMUSCULAR; INTRAVENOUS at 10:49

## 2024-03-18 RX ADMIN — ONDANSETRON 4 MG: 2 INJECTION INTRAMUSCULAR; INTRAVENOUS at 10:52

## 2024-03-18 RX ADMIN — FENTANYL CITRATE 50 MCG: 50 INJECTION, SOLUTION INTRAMUSCULAR; INTRAVENOUS at 09:35

## 2024-03-18 RX ADMIN — PROPOFOL 150 MG: 10 INJECTION, EMULSION INTRAVENOUS at 09:19

## 2024-03-18 RX ADMIN — EPHEDRINE SULFATE 10 MG: 50 INJECTION, SOLUTION INTRAVENOUS at 09:24

## 2024-03-18 RX ADMIN — SODIUM CHLORIDE, POTASSIUM CHLORIDE, SODIUM LACTATE AND CALCIUM CHLORIDE: 600; 310; 30; 20 INJECTION, SOLUTION INTRAVENOUS at 08:06

## 2024-03-18 RX ADMIN — Medication 200 MCG: at 09:28

## 2024-03-18 RX ADMIN — ACETAMINOPHEN 1000 MG: 500 TABLET ORAL at 08:00

## 2024-03-18 RX ADMIN — EPHEDRINE SULFATE 10 MG: 50 INJECTION, SOLUTION INTRAVENOUS at 09:27

## 2024-03-18 RX ADMIN — HYDROMORPHONE HYDROCHLORIDE 0.5 MG: 2 INJECTION INTRAMUSCULAR; INTRAVENOUS; SUBCUTANEOUS at 09:19

## 2024-03-18 RX ADMIN — TRANEXAMIC ACID 1000 MG: 100 INJECTION, SOLUTION INTRAVENOUS at 10:52

## 2024-03-18 RX ADMIN — TRANEXAMIC ACID 1000 MG: 100 INJECTION, SOLUTION INTRAVENOUS at 09:20

## 2024-03-18 RX ADMIN — CEFAZOLIN 2 G: 1 INJECTION, POWDER, FOR SOLUTION INTRAMUSCULAR; INTRAVENOUS at 09:20

## 2024-03-18 RX ADMIN — LIDOCAINE HYDROCHLORIDE 50 MG: 20 INJECTION, SOLUTION EPIDURAL; INFILTRATION; INTRACAUDAL at 09:19

## 2024-03-18 RX ADMIN — DEXAMETHASONE SODIUM PHOSPHATE 8 MG: 4 INJECTION INTRA-ARTICULAR; INTRALESIONAL; INTRAMUSCULAR; INTRAVENOUS; SOFT TISSUE at 09:20

## 2024-03-18 ASSESSMENT — COGNITIVE AND FUNCTIONAL STATUS - GENERAL
SUGGESTED CMS G CODE MODIFIER MOBILITY: CJ
MOBILITY SCORE: 22
CLIMB 3 TO 5 STEPS WITH RAILING: A LITTLE
WALKING IN HOSPITAL ROOM: A LITTLE

## 2024-03-18 ASSESSMENT — GAIT ASSESSMENTS
GAIT LEVEL OF ASSIST: SUPERVISED
DISTANCE (FEET): 130
DEVIATION: ANTALGIC
ASSISTIVE DEVICE: FRONT WHEEL WALKER

## 2024-03-18 ASSESSMENT — PAIN DESCRIPTION - PAIN TYPE: TYPE: SURGICAL PAIN

## 2024-03-18 NOTE — THERAPY
Physical Therapy   Initial Evaluation     Patient Name: Griselda Obregon  Age:  73 y.o., Sex:  female  Medical Record #: [id number]  Today's Date: 3/18/2024     Precautions  Precautions: (P) Weight Bearing As Tolerated Right Lower Extremity    Assessment  Patient is 73 y.o. female who was seen s/p right unicompartmental knee arthoplasty with WBAT orders for right lower extremity. Pt was agreeable to therapy evaluation and presented with safe upright functional mobility with AD use after therapy session. Pt was provided with education on elevation, icing, positioning, and supine/seated therapeutic exercises. Pt was able to return demo safe gait mechanics with use of AD on flat level surfaces with appropriate heel to toe gait mechanics. Pt was able to return demo safe mechanics in order to navigate stairs with use of FWW. Pt was able to demonstrate safe use of AD during transfers/transitions and general locomotion with no claudy LOB. Pt has no acute skilled PT needs at this time, anticipate pt to d/c home once medically clear with recs for FWW use and OP therapy services.     The pt was provided with the following skilled therapy txt: Pt was provided with VC, demo, and facilitation during gait training in order to return demo terminal knee extension and appropriate heel strike/flexion of knee in order to return demo appropriate heel to toe gait mechanics. Pt was provided with demo and VC to go up/down steps with safe mechanics with use of AD. Pt was provided with VC and TC for appropriate quad contraction and mechanics during supine/seated therapeutic exercises. Pt was provided with education on intensity, frequency, and duration of exercises.    Plan    DC Equipment Recommendations: (P) Front-Wheel Walker  Discharge Recommendations: (P) Recommend outpatient physical therapy services to address higher level deficits     Objective       03/18/24 1410   Initial Contact Note    Initial Contact Note Order Received and  Verified, Evaluation Only - Patient Does Not Require Further Acute Physical Therapy at this Time.  However, May Benefit from Post Acute Therapy for Higher Level Functional Deficits.   Precautions   Precautions Weight Bearing As Tolerated Right Lower Extremity   Pain 0 - 10 Group   Location Knee   Location Orientation Right   Description Aching   Therapist Pain Assessment Prior to Activity;During Activity;Post Activity;Nurse Notified;1   Prior Living Situation   Prior Services None   Housing / Facility 1 Story House   Steps Into Home 1   Steps In Home 0   Equipment Owned None   Lives with - Patient's Self Care Capacity Alone and Able to Care For Self   Comments pt states she will have her son and friend assist upon dc to home. Friend will be staying with her for a few days   Prior Level of Functional Mobility   Bed Mobility Independent   Transfer Status Independent   Ambulation Independent   Ambulation Distance   (community)   Assistive Devices Used None   Stairs Independent   History of Falls   History of Falls No   Cognition    Cognition / Consciousness WDL   Level of Consciousness Alert   Passive ROM Upper Body   Passive ROM Upper Body WDL   Active ROM Upper Body   Active ROM Upper Body  WDL   Strength Upper Body   Upper Body Strength  WDL   Passive ROM Lower Body   Passive ROM Lower Body X   Comments limited in  RLE due to pain, able to demo functional PROM for B LE   Active ROM Lower Body    Active ROM Lower Body  X   Comments same as above   Strength Lower Body   Lower Body Strength  X   Comments limited due to pain, able to demo functional strength for B LE   Sensation Lower Body   Lower Extremity Sensation   WDL   Lower Body Muscle Tone   Lower Body Muscle Tone  WDL   Neurological Concerns   Neurological Concerns No   Coordination Upper Body   Coordination WDL   Coordination Lower Body    Coordination Lower Body  WDL   Balance Assessment   Sitting Balance (Static) Good   Sitting Balance (Dynamic) Fair +    Standing Balance (Static) Good   Standing Balance (Dynamic) Fair +   Weight Shift Sitting Good   Weight Shift Standing Fair   Comments w/fww use   Gait Analysis   Gait Level Of Assist Supervised   Assistive Device Front Wheel Walker   Distance (Feet) 130   # of Times Distance was Traveled 1   Deviation Antalgic   # of Stairs Climbed 2   Level of Assist with Stairs Supervised   Weight Bearing Status WBAT R LE   Functional Mobility   Sit to Stand Supervised   Bed, Chair, Wheelchair Transfer Supervised   Toilet Transfers Standby Assist   Transfer Method Stand Step   Mobility w/fww use   6 Clicks Assessment - How much HELP from from another person do you currently need... (If the patient hasn't done an activity recently, how much help from another person do you think he/she would need if he/she tried?)   Turning from your back to your side while in a flat bed without using bedrails? 4   Moving from lying on your back to sitting on the side of a flat bed without using bedrails? 4   Moving to and from a bed to a chair (including a wheelchair)? 4   Standing up from a chair using your arms (e.g., wheelchair, or bedside chair)? 4   Walking in hospital room? 3   Climbing 3-5 steps with a railing? 3   6 clicks Mobility Score 22   Activity Tolerance   Sitting in Chair functional   Sitting Edge of Bed NT   Standing 10 mins   Comments no adverse events noted   Edema / Skin Assessment   Edema / Skin  Not Assessed   Education Group   Education Provided Role of Physical Therapist   Role of Physical Therapist Patient Response Patient;Acceptance;Explanation;Demonstration;Verbal Demonstration;Action Demonstration   Anticipated Discharge Equipment and Recommendations   DC Equipment Recommendations Front-Wheel Walker   Discharge Recommendations Recommend outpatient physical therapy services to address higher level deficits   Interdisciplinary Plan of Care Collaboration   IDT Collaboration with  Nursing   Patient Position at End of  Therapy Seated;Call Light within Reach;Tray Table within Reach;Phone within Reach;Family / Friend in Room   Collaboration Comments aware of visit and recs   Session Information   Date / Session Number  3/18 eval only   Priority 0

## 2024-03-18 NOTE — ANESTHESIA TIME REPORT
Anesthesia Start and Stop Event Times       Date Time Event    3/18/2024 0855 Ready for Procedure     0915 Anesthesia Start     1108 Anesthesia Stop          Responsible Staff  03/18/24      Name Role Begin End    Kelby Carranza M.D. Anesth 0915 1108          Overtime Reason:  no overtime (within assigned shift)    Comments:

## 2024-03-18 NOTE — DISCHARGE INSTRUCTIONS
What to Expect Post Anesthesia    Rest and take it easy for the first 24 hours.  A responsible adult is recommended to remain with you during that time.  It is normal to feel sleepy.  We encourage you to not do anything that requires balance, judgment or coordination.    FOR 24 HOURS DO NOT:  Drive, operate machinery or run household appliances.  Drink beer or alcoholic beverages.  Make important decisions or sign legal documents.    To avoid nausea, slowly advance diet as tolerated, avoiding spicy or greasy foods for the first day.  Add more substantial food to your diet according to your provider's instructions. INCREASE FLUIDS AND FIBER TO AVOID CONSTIPATION.    MILD FLU-LIKE SYMPTOMS ARE NORMAL.  YOU MAY EXPERIENCE GENERALIZED MUSCLE ACHES, THROAT IRRITATION, HEADACHE AND/OR SOME NAUSEA.    If any questions arise, call your provider.  If your provider is not available, please feel free to call the Surgical Center at [phone].    MEDICATIONS: Resume taking daily medication.  Take prescribed pain medication with food.  If no medication is prescribed, you may take non-aspirin pain medication if needed.  PAIN MEDICATION CAN BE VERY CONSTIPATING.  Take a stool softener or laxative such as senokot, pericolace, or milk of magnesia if needed.    Last pain medication given at _________________________________    DR MARSHALL DISCHARGE INSTRUCTIONS    1. DISCHARGE home with  crutches/ walker   2. DISCHARGE home on home meds per home doses   3. DISCHARGE home on Percocet for pain, ASA for blood clot prevention, and Outpt Physical Therapy.   4. Follow up [facility] 10-14 days, call [phone] to confirm follow up appointment   5. Call for Fevers, drainage, redness, shortness of breath, or increasing lower extremity swelling particularly in the calf.   6. Start Aspirin 325mg. Use for 45 days.   7. Leave Bandage in place, call if drainage or wetness.     IMPORTANT NOTE IF YOU RECEIVED EXPAREL:    The liposomal  bupivacaine (Exparel) teal arm band is used as a visual queue to alert healthcare professionals that you received a long-acting form of bupivacaine during your recent surgery. You should not receive additional local anesthetics (i.e. bupivacaine, lidocaine) for 96 hours after surgery. There may be situations where you are unable to communicate this information, so it is important for your safety to keep the teal arm band on for 96 hours (4 days) after surgery.       Lower Extremity Peripheral Nerve Block Discharge Instructions     What to Expect - Lower Extremity  The block may cause you to experience numbness and weakness in your hip and thigh, thigh and knee, or calf and foot on the same side as your surgery  Numbness, tingling and / or weakness are all normal. For some people, this may be an unpleasant sensation  These issues will be resolved when the local anesthetic wears off   You may experience numbness and tingling in your thigh on the same side as your surgery if the block medicine was injected at your groin area  Numbness will make it difficult to walk  You may have problems with balance and walking so be very careful   Follow your surgeon's direction regarding weight bearing on your surgical limb  Be very careful with your numb limb    Precautions  The numbness may affect your balance  Be careful when walking or moving around  Your leg may be weak: be very careful putting weight on it  If your surgeon did not specify a time, you should not bear weight for 24 hours  Be sure to ask for help when you need it  It is better to have help than to fall and hurt yourself    Prevent Injury  Protect the limb like a baby  Beware of exposing your limb to extreme heat or cold or trauma  The limb may be injured without you noticing because it is numb  Keep the limb elevated whenever possible  Do not sleep on the limb  Change the position of the limb regularly  Avoid putting pressure on your surgical limb    Pain  "Control  The initial block on the day of surgery will make your extremity feel \"numb\"  Any consecutive injection including prior to discharge from the hospital will make your extremity feel \"numb\"  You may feel an aching or burning when the local anesthesia starts to wear off  Take pain pills as prescribed by your surgeon  Call your surgeon or anesthesiologist if you do not have adequate pain control     "

## 2024-03-18 NOTE — OR NURSING
1103: Patient arrived from OR via gurney.  RLE dressing CDI; cap refill to right toes less than 3. Cold pack placed.    Sedation/Resp Status: Non responsive to verbal with OPA in place. Respirations spontaneous and non-labored.    HR 70s SR; VSS on 6L 02 via mask.     1115: Cont stable with OPA in place. RLE dressing CDI. VSS on 6L 02 via mask.     1125: OPA out for return of spontaneous eye opening/gag reflex - respirations continue spontaneous and non-labored.     1130: Cont drowsy, denies pain. No nausea. RLE dressing CDI.     1145: Imaging at bedside. Cont to deny pain. Increasingly awake. No changes to RLE dressing.     1200: Raised HOB and repositioned. Trialing on RA. Agreeable to sips of clears. Denies nausea. RLE dressing CDI with cold pack in place. Right pedal pulse +2.     1215: Cont stable, no changes to pain and nausea. RLE dressing cont CDI. A/OX3. Able to wiggle toes, and press/pull foot, but unable to lift leg off of bed at this time.     1230: Cont stable, pending assessment by PT when available. Otherwise, no changes to pain, nausea, and RLE dressing.     1247: Report to [facility]    1305: Report from [facility].    1310: Janine SMITH rounded, assessed patient RLE, ok'd to graduate to stand trial. Pending CNA stand assist.     1324: CNA at bedside for stand assist trial. Patient VSS on RA. Cont to deny pain. RLE dressing CDI.     1333: Patient able to stand, march, and walk with FWW SBA assist. Meets criteria to transfer to transfer to Stage II for DC.

## 2024-03-18 NOTE — OR NURSING
1333: Patient arrived to phase II from PACU 1 via gurney. Report received from RN. Respirations are spontaneous and unlabored. VSS on RA. Dressing is CDI. RLE: pedal pulse is 2+, cap refill less than 3 seconds, warm.    1345: Family at bedside.     1346: PT at bedside    1400: cleared by PT    1426: Patient education completed, family denies further questions. IV removed with tip intact. DC'd to care of family post uneventful stay in PACU 2.

## 2024-03-18 NOTE — ANESTHESIA PROCEDURE NOTES
Airway    Date/Time: 3/18/2024 9:19 AM    Performed by: Kelby Carranza M.D.  Authorized by: Kelby Carranza M.D.    Location:  OR  Urgency:  Elective  Indications for Airway Management:  Anesthesia      Spontaneous Ventilation: absent    Sedation Level:  Deep  Preoxygenated: Yes    Final Airway Type:  Supraglottic airway  Final Supraglottic Airway:  Standard LMA    SGA Size:  4  Number of Attempts at Approach:  1

## 2024-03-18 NOTE — ANESTHESIA PREPROCEDURE EVALUATION
Case: [id number] Date/Time: 03/18/24 0830    Procedures:       RIGHT KNEE UNICOMPARTMENTAL ARTHROPLASTY, POSSIBLE LOOSE BODY EXCISION AND REPAIR AS INDICATED      REMOVAL, LOOSE BODY, JOINT    Pre-op diagnosis: OSTEOARTHRITIS OF RIGHT KNEE JOINT    Location:  OR 06 / SURGERY [facility]    Surgeons: Les Dyson M.D.            Relevant Problems   ANESTHESIA (within normal limits)      PULMONARY (within normal limits)      NEURO (within normal limits)      CARDIAC (within normal limits)      GI (within normal limits)         (positive) Bilateral renal cysts      ENDO (within normal limits)      Other   (positive) Arthrosis of hip   (positive) BMI 28.0-28.9,adult   (positive) Decreased GFR   (positive) Dyslipidemia       Physical Exam    Airway   Mallampati: II  TM distance: >3 FB  Neck ROM: full       Cardiovascular - normal exam  Rhythm: regular  Rate: normal  (-) murmur     Dental - normal exam           Pulmonary - normal exam  Breath sounds clear to auscultation     Abdominal    Neurological - normal exam                   Anesthesia Plan    ASA 2       Plan - general       Airway plan will be LMA          Induction: intravenous    Postoperative Plan: Postoperative administration of opioids is intended.    Pertinent diagnostic labs and testing reviewed    Informed Consent:    Anesthetic plan and risks discussed with patient.    Use of blood products discussed with: patient whom consented to blood products.

## 2024-03-18 NOTE — OP REPORT
DATE OF OPERATION: 3/18/2024    PREOPERATIVE DIAGNOSIS: Osteoarthritis, right knee medial compartment.   POSTOPERATIVE DIAGNOSIS: Osteoarthritis, right knee\medial compartment    PROCEDURE: 1. Right unicompartmental knee arthroplasty.       2. Intraoperative block for relief of extensive postoperative pain.    SURGEON: Les Dyson MD.   ASSISTANT: MEHDI Vasquez    ANESTHESIOLOGIST:Kelby Carranza M.D.    ANESTHESIA: General with intraoperative local block.       WEIGHT BEARING: as tolerated    FINDINGS:   1. 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       PROCEDURE IN DETAIL: The patient was taken to the operating room where he   underwent general anesthetic supine position. Right lower extremity isolated,   prepped using Hibiclens, alcohol ChloraPrep, draped using sterile technique.   An appropriate timeout was undertaken. The patient's right lower extremity was   addressed. The body-exhaust uniforms and perioperative antibiotics were   given. Air Isolation draping utilized.    Surgery was initiated with a minimally invasive quadriceps snip approach.   Skin and subcutaneous tissue were incised. Hemostasis obtained. The   Wayne's layer was dissected separately. The medial parapatellar quad snip   was undertaken. The soft tissues were elevated off the proximal medial tibia.   The ACL ,The LCL, PCL and   MCL were all maintained.   Intraoperative periarticular and extensive pankaj-incisional block was undertaken. This was undertaken with a combination of Toradol ropivacaine and morphine to treat postoperative pain. This was done extensively throughout the pericapsular tissues, followed by infusion into the subcutaneous tissues. This obtained a significant intraoperative improvement in anesthesia requirements as well as pain management and vitals stabilization.    At this point the tibia was  addressed with the appropriate proximal tibial cutting jig.  The position was undertaken with excellent position.  The proximal tibia was carefully protected using retractors and then neck cut made.  The fragment was able to be removed and sized.  It was sized to be a size 8.  A small cyst was obtained on the proximal tibia which was curetted and debrided    Next the femur was addressed with a distal cut followed by a condylar cut and then resection of the proximal posterior condyle with a curved osteotome.  This obtained excellent position.  After these were trialed with a size 8 and 9 poly the 9 was chosen and the implantation undertaken.    The pulse lavage was undertaken the bone was dried cement undertaken to the tibia followed by the femur followed by final placement of the polyethylene.    Excellent soft tissue balance was encountered.  Once this was accomplished terminal extension was obtained with a spacer to obtain cementation hardening.  Next the excess cement was removed and the bone screws removed.  Periarticular anesthetization with Exparel was followed with closure.    The closure was undertaken after obtaining hemostasis. Interrupted #2 Tycron were utilized proximally and then #0 Quil suture was utilized to close the deep layer.  Marshalls layer was closed and pulse lavage was repeated. Subcutaneous closure was undertaken and Prineo applied to the incision with Dermabond.  A silver impregnated bandage was utilized.  The patient placed into a sterile bandage, awoken   From the anesthetic and taken to the recovery room. The patient tolerated the   procedure very well with no signs of complications.

## 2024-03-19 ASSESSMENT — PAIN SCALES - GENERAL: PAIN_LEVEL: 0

## 2024-03-19 NOTE — ANESTHESIA POSTPROCEDURE EVALUATION
Patient: Griselda Obregon    Procedure Summary       Date: 03/18/24 Room / Location:  OR 06 / SURGERY [facility]    Anesthesia Start: 0915 Anesthesia Stop: 1108    Procedure: RIGHT KNEE UNICOMPARTMENTAL ARTHROPLASTY, Intraoperative block for relief of extensive postoperative pain. (Right: Knee) Diagnosis: (Osteoarthritis, right knee\medial compartment)    Surgeons: Les Dyson M.D. Responsible Provider: Kelby Carranza M.D.    Anesthesia Type: general ASA Status: 2            Final Anesthesia Type: general  Last vitals  BP   Blood Pressure : 123/62    Temp   36.8 °C (98.2 °F)    Pulse   93   Resp   16    SpO2   93 %      Anesthesia Post Evaluation    Patient location during evaluation: PACU  Patient participation: complete - patient participated  Level of consciousness: awake and alert  Pain score: 0    Airway patency: patent  Anesthetic complications: no  Cardiovascular status: hemodynamically stable  Respiratory status: acceptable  Hydration status: euvolemic    PONV: none          No notable events documented.     Nurse Pain Score: 0 (NPRS)

## 2024-04-03 RX ORDER — CEPHALEXIN 500 MG/1
CAPSULE ORAL
Qty: 28 CAPSULE | Refills: 0 | OUTPATIENT
Start: 2024-04-03

## 2024-05-01 ENCOUNTER — HOSPITAL ENCOUNTER (OUTPATIENT)
Dept: RADIOLOGY | Facility: MEDICAL CENTER | Age: 74
End: 2024-05-01
Attending: FAMILY MEDICINE
Payer: MEDICARE

## 2024-05-01 DIAGNOSIS — Z12.31 VISIT FOR SCREENING MAMMOGRAM: ICD-10-CM

## 2024-05-01 DIAGNOSIS — Z78.0 MENOPAUSE: ICD-10-CM

## 2024-05-01 DIAGNOSIS — M85.852 OTHER SPECIFIED DISORDERS OF BONE DENSITY AND STRUCTURE, LEFT THIGH: ICD-10-CM

## 2024-08-01 DIAGNOSIS — F51.01 PRIMARY INSOMNIA: ICD-10-CM

## 2024-08-01 RX ORDER — TRAZODONE HYDROCHLORIDE 50 MG/1
50 TABLET, FILM COATED ORAL NIGHTLY
Qty: 100 TABLET | Refills: 1 | Status: SHIPPED | OUTPATIENT
Start: 2024-08-01

## 2024-08-29 DIAGNOSIS — F33.42 RECURRENT MAJOR DEPRESSIVE DISORDER, IN FULL REMISSION (HCC): ICD-10-CM

## 2024-08-29 RX ORDER — VENLAFAXINE HYDROCHLORIDE 150 MG/1
150 CAPSULE, EXTENDED RELEASE ORAL EVERY EVENING
Qty: 90 CAPSULE | Refills: 1 | Status: SHIPPED | OUTPATIENT
Start: 2024-08-29

## 2024-08-29 NOTE — TELEPHONE ENCOUNTER
Received request via: Pharmacy    Was the patient seen in the last year in this department? No    Does the patient have an active prescription (recently filled or refills available) for medication(s) requested? No      Does the patient have nursing home Plus and need 100-day supply? (This applies to ALL medications) Patient does not have SCP

## 2024-09-25 ENCOUNTER — HOSPITAL ENCOUNTER (OUTPATIENT)
Dept: LAB | Facility: MEDICAL CENTER | Age: 74
End: 2024-09-25
Attending: FAMILY MEDICINE
Payer: MEDICARE

## 2024-09-25 LAB
25(OH)D3 SERPL-MCNC: 48 NG/ML (ref 30–100)
ALBUMIN SERPL BCP-MCNC: 4.2 G/DL (ref 3.2–4.9)
ALBUMIN/GLOB SERPL: 1.8 G/DL
ALP SERPL-CCNC: 71 U/L (ref 30–99)
ALT SERPL-CCNC: 13 U/L (ref 2–50)
ANION GAP SERPL CALC-SCNC: 12 MMOL/L (ref 7–16)
AST SERPL-CCNC: 18 U/L (ref 12–45)
BASOPHILS # BLD AUTO: 0.8 % (ref 0–1.8)
BASOPHILS # BLD: 0.05 K/UL (ref 0–0.12)
BILIRUB SERPL-MCNC: 0.4 MG/DL (ref 0.1–1.5)
BUN SERPL-MCNC: 13 MG/DL (ref 8–22)
CALCIUM ALBUM COR SERPL-MCNC: 9.3 MG/DL (ref 8.5–10.5)
CALCIUM SERPL-MCNC: 9.5 MG/DL (ref 8.5–10.5)
CHLORIDE SERPL-SCNC: 102 MMOL/L (ref 96–112)
CHOLEST SERPL-MCNC: 174 MG/DL (ref 100–199)
CO2 SERPL-SCNC: 23 MMOL/L (ref 20–33)
CREAT SERPL-MCNC: 0.8 MG/DL (ref 0.5–1.4)
EOSINOPHIL # BLD AUTO: 0.04 K/UL (ref 0–0.51)
EOSINOPHIL NFR BLD: 0.7 % (ref 0–6.9)
ERYTHROCYTE [DISTWIDTH] IN BLOOD BY AUTOMATED COUNT: 50 FL (ref 35.9–50)
EST. AVERAGE GLUCOSE BLD GHB EST-MCNC: 91 MG/DL
FOLATE SERPL-MCNC: 9 NG/ML
GFR SERPLBLD CREATININE-BSD FMLA CKD-EPI: 78 ML/MIN/1.73 M 2
GLOBULIN SER CALC-MCNC: 2.4 G/DL (ref 1.9–3.5)
GLUCOSE SERPL-MCNC: 83 MG/DL (ref 65–99)
HBA1C MFR BLD: 4.8 % (ref 4–5.6)
HCT VFR BLD AUTO: 44.8 % (ref 37–47)
HDLC SERPL-MCNC: 50 MG/DL
HGB BLD-MCNC: 14.9 G/DL (ref 12–16)
IMM GRANULOCYTES # BLD AUTO: 0.01 K/UL (ref 0–0.11)
IMM GRANULOCYTES NFR BLD AUTO: 0.2 % (ref 0–0.9)
LDLC SERPL CALC-MCNC: 108 MG/DL
LYMPHOCYTES # BLD AUTO: 1.51 K/UL (ref 1–4.8)
LYMPHOCYTES NFR BLD: 25 % (ref 22–41)
MCH RBC QN AUTO: 33.3 PG (ref 27–33)
MCHC RBC AUTO-ENTMCNC: 33.3 G/DL (ref 32.2–35.5)
MCV RBC AUTO: 100 FL (ref 81.4–97.8)
MONOCYTES # BLD AUTO: 0.42 K/UL (ref 0–0.85)
MONOCYTES NFR BLD AUTO: 7 % (ref 0–13.4)
NEUTROPHILS # BLD AUTO: 4.01 K/UL (ref 1.82–7.42)
NEUTROPHILS NFR BLD: 66.3 % (ref 44–72)
NRBC # BLD AUTO: 0 K/UL
NRBC BLD-RTO: 0 /100 WBC (ref 0–0.2)
PLATELET # BLD AUTO: 253 K/UL (ref 164–446)
PMV BLD AUTO: 10.9 FL (ref 9–12.9)
POTASSIUM SERPL-SCNC: 4.4 MMOL/L (ref 3.6–5.5)
PROT SERPL-MCNC: 6.6 G/DL (ref 6–8.2)
RBC # BLD AUTO: 4.48 M/UL (ref 4.2–5.4)
SODIUM SERPL-SCNC: 137 MMOL/L (ref 135–145)
T4 FREE SERPL-MCNC: 0.89 NG/DL (ref 0.93–1.7)
TRIGL SERPL-MCNC: 79 MG/DL (ref 0–149)
TSH SERPL-ACNC: 3.24 UIU/ML (ref 0.35–5.5)
VIT B12 SERPL-MCNC: 399 PG/ML (ref 211–911)
WBC # BLD AUTO: 6 K/UL (ref 4.8–10.8)

## 2024-09-25 PROCEDURE — 82746 ASSAY OF FOLIC ACID SERUM: CPT

## 2024-09-25 PROCEDURE — 83695 ASSAY OF LIPOPROTEIN(A): CPT

## 2024-09-25 PROCEDURE — 82306 VITAMIN D 25 HYDROXY: CPT

## 2024-09-25 PROCEDURE — 80061 LIPID PANEL: CPT

## 2024-09-25 PROCEDURE — 82607 VITAMIN B-12: CPT

## 2024-09-25 PROCEDURE — 85025 COMPLETE CBC W/AUTO DIFF WBC: CPT

## 2024-09-25 PROCEDURE — 36415 COLL VENOUS BLD VENIPUNCTURE: CPT

## 2024-09-25 PROCEDURE — 84439 ASSAY OF FREE THYROXINE: CPT

## 2024-09-25 PROCEDURE — 82172 ASSAY OF APOLIPOPROTEIN: CPT

## 2024-09-25 PROCEDURE — 80053 COMPREHEN METABOLIC PANEL: CPT

## 2024-09-25 PROCEDURE — 83036 HEMOGLOBIN GLYCOSYLATED A1C: CPT

## 2024-09-25 PROCEDURE — 84443 ASSAY THYROID STIM HORMONE: CPT

## 2024-09-27 LAB
APO B100 SERPL-MCNC: 92 MG/DL (ref 60–117)
LPA SERPL-MCNC: 7 MG/DL

## 2024-11-07 ENCOUNTER — HOSPITAL ENCOUNTER (OUTPATIENT)
Dept: LAB | Facility: MEDICAL CENTER | Age: 74
End: 2024-11-07
Attending: FAMILY MEDICINE
Payer: MEDICARE

## 2024-11-07 PROCEDURE — 36415 COLL VENOUS BLD VENIPUNCTURE: CPT

## 2024-11-07 PROCEDURE — 84443 ASSAY THYROID STIM HORMONE: CPT

## 2024-11-07 PROCEDURE — 84439 ASSAY OF FREE THYROXINE: CPT

## 2024-11-08 LAB
T4 FREE SERPL-MCNC: 0.88 NG/DL (ref 0.93–1.7)
TSH SERPL-ACNC: 3.73 UIU/ML (ref 0.35–5.5)

## 2024-12-05 ENCOUNTER — HOSPITAL ENCOUNTER (OUTPATIENT)
Dept: RADIOLOGY | Facility: MEDICAL CENTER | Age: 74
End: 2024-12-05
Attending: FAMILY MEDICINE
Payer: COMMERCIAL

## 2024-12-05 DIAGNOSIS — E78.5 HYPERLIPOPROTEINEMIA: ICD-10-CM

## 2024-12-05 PROCEDURE — 4410556 CT-CARDIAC SCORING (SELF PAY ONLY)

## 2025-03-20 DIAGNOSIS — F33.42 RECURRENT MAJOR DEPRESSIVE DISORDER, IN FULL REMISSION (HCC): ICD-10-CM

## 2025-03-20 RX ORDER — VENLAFAXINE HYDROCHLORIDE 150 MG/1
150 CAPSULE, EXTENDED RELEASE ORAL EVERY EVENING
Qty: 90 CAPSULE | Refills: 1 | Status: SHIPPED | OUTPATIENT
Start: 2025-03-20

## 2025-04-23 ENCOUNTER — TELEPHONE (OUTPATIENT)
Dept: HEALTH INFORMATION MANAGEMENT | Facility: OTHER | Age: 75
End: 2025-04-23
Payer: MEDICARE

## 2025-05-05 ENCOUNTER — HOSPITAL ENCOUNTER (OUTPATIENT)
Dept: LAB | Facility: MEDICAL CENTER | Age: 75
End: 2025-05-05
Attending: FAMILY MEDICINE
Payer: MEDICARE

## 2025-05-05 LAB
CHOLEST SERPL-MCNC: 174 MG/DL (ref 100–199)
FASTING STATUS PATIENT QL REPORTED: NORMAL
HDLC SERPL-MCNC: 45 MG/DL
LDLC SERPL CALC-MCNC: 111 MG/DL
T4 FREE SERPL-MCNC: 0.7 NG/DL (ref 0.93–1.7)
TRIGL SERPL-MCNC: 89 MG/DL (ref 0–149)
TSH SERPL-ACNC: 3.55 UIU/ML (ref 0.38–5.33)

## 2025-05-05 PROCEDURE — 36415 COLL VENOUS BLD VENIPUNCTURE: CPT

## 2025-05-05 PROCEDURE — 82172 ASSAY OF APOLIPOPROTEIN: CPT

## 2025-05-05 PROCEDURE — 84439 ASSAY OF FREE THYROXINE: CPT

## 2025-05-05 PROCEDURE — 80061 LIPID PANEL: CPT

## 2025-05-05 PROCEDURE — 84443 ASSAY THYROID STIM HORMONE: CPT

## 2025-05-06 ENCOUNTER — HOSPITAL ENCOUNTER (OUTPATIENT)
Dept: RADIOLOGY | Facility: MEDICAL CENTER | Age: 75
End: 2025-05-06
Attending: FAMILY MEDICINE
Payer: MEDICARE

## 2025-05-06 DIAGNOSIS — Z12.31 VISIT FOR SCREENING MAMMOGRAM: ICD-10-CM

## 2025-05-06 PROCEDURE — 77067 SCR MAMMO BI INCL CAD: CPT

## 2025-05-07 LAB — APO B100 SERPL-MCNC: 94 MG/DL (ref 60–117)

## (undated) DEVICE — TOWEL STOP TIMEOUT SAFETY FLAG (40EA/CA)

## (undated) DEVICE — BLADE SURGICAL #15 - (50/BX 3BX/CA)

## (undated) DEVICE — PAD PREP 24 X 48 CUFFED - (100/CA)

## (undated) DEVICE — SCULPS CEMENT (USE W/BONE - GLUE) 2/PK 6PK/BX

## (undated) DEVICE — COVER LIGHT HANDLE FLEXIBLE - SOFT (2EA/PK 80PK/CA)

## (undated) DEVICE — SET EXTENSION WITH 2 PORTS (48EA/CA) ***PART #2C8610 IS A SUBSTITUTE*****

## (undated) DEVICE — TUBING CLEARLINK DUO-VENT - C-FLO (48EA/CA)

## (undated) DEVICE — LACTATED RINGERS INJ 1000 ML - (14EA/CA 60CA/PF)

## (undated) DEVICE — SYRINGE SAFETY 5 ML 18 GA X 1-1/2 BLUNT LL (100/BX 4BX/CA)

## (undated) DEVICE — DRAPE LOWER EXTREMETY - (6/CA)

## (undated) DEVICE — TOURNIQUET CUFF 34 X 4 ONE PORT DISP - STERILE (10/BX)

## (undated) DEVICE — SYRINGE STERILE 10 ML LL (200/BX)

## (undated) DEVICE — CANISTER SUCTION RIGID RED 1500CC (40EA/CA)

## (undated) DEVICE — BOVIE BLADE COATED &INSULATED - 25/PK

## (undated) DEVICE — DRAPE IOBAN II INCISE 23X17 - (10EA/BX 4BX/CA)

## (undated) DEVICE — TIP INTPLS HFLO ML ORFC BTRY - (12/CS)  FOR SURGILAV

## (undated) DEVICE — ELECTRODE DUAL RETURN W/ CORD - (50/PK)

## (undated) DEVICE — HANDPIECE 10FT INTPLS SCT PLS IRRIGATION HAND CONTROL SET (6/PK)

## (undated) DEVICE — DRAPE LARGE 3 QUARTER - (20/CA)

## (undated) DEVICE — BLADE SURGICAL #10 - (50/BX)

## (undated) DEVICE — SUCTION INSTRUMENT YANKAUER BULBOUS TIP W/O VENT (50EA/CA)

## (undated) DEVICE — SODIUM CHL. IRRIGATION 0.9% 3000ML (4EA/CA 65CA/PF)

## (undated) DEVICE — SODIUM CHL IRRIGATION 0.9% 1000ML (12EA/CA)

## (undated) DEVICE — GLOVE BIOGEL SZ 8 SURGICAL PF LTX - (50PR/BX 4BX/CA)

## (undated) DEVICE — BANDAGEESMARK BLUE 6X9' LF - 20/CS"

## (undated) DEVICE — STOCKINET TUBULAR 6IN STERILE - 6 X 48YDS (25/CA)

## (undated) DEVICE — SENSOR OXIMETER ADULT SPO2 RD SET (20EA/BX)

## (undated) DEVICE — HUMID-VENT HEAT AND MOISTURE EXCHANGE- (50/BX)

## (undated) DEVICE — DRAPE SRG LG BCK TBL DISP - 10/CA